# Patient Record
Sex: FEMALE | Race: WHITE | Employment: FULL TIME | URBAN - METROPOLITAN AREA
[De-identification: names, ages, dates, MRNs, and addresses within clinical notes are randomized per-mention and may not be internally consistent; named-entity substitution may affect disease eponyms.]

---

## 2024-04-15 ENCOUNTER — OFFICE VISIT (OUTPATIENT)
Dept: ORTHOPEDIC SURGERY | Facility: CLINIC | Age: 69
End: 2024-04-15
Payer: MEDICARE

## 2024-04-15 VITALS — HEIGHT: 62 IN | BODY MASS INDEX: 31.28 KG/M2 | WEIGHT: 170 LBS

## 2024-04-15 DIAGNOSIS — G56.03 CARPAL TUNNEL SYNDROME, BILATERAL: Primary | ICD-10-CM

## 2024-04-15 PROCEDURE — 99203 OFFICE O/P NEW LOW 30 MIN: CPT | Performed by: ORTHOPAEDIC SURGERY

## 2024-04-15 PROCEDURE — 1036F TOBACCO NON-USER: CPT | Performed by: ORTHOPAEDIC SURGERY

## 2024-04-15 ASSESSMENT — PAIN DESCRIPTION - DESCRIPTORS: DESCRIPTORS: BURNING;ACHING;TINGLING

## 2024-04-15 ASSESSMENT — PAIN SCALES - GENERAL: PAINLEVEL_OUTOF10: 5 - MODERATE PAIN

## 2024-04-15 ASSESSMENT — PAIN - FUNCTIONAL ASSESSMENT: PAIN_FUNCTIONAL_ASSESSMENT: 0-10

## 2024-04-15 NOTE — PROGRESS NOTES
Melissa is a 68 y.o.-year-old right hand dominant female presenting today for evaluation of a long standing history of bilateral hand numbness and weakness.    She was diagnosed with carpal tunnel syndrome many years ago by her primary care physician based on physical examination findings. She has not had any objective testing. She was recommended to consider surgery several years ago, but ignored this recommendation and has been more or less living with it and has noticed progressive pain, progressive weakness and numbness in her hands. She wears braces at night with minimal improvement. She has not had any injections. She also has deformity of her left index finger at the PIP joint that she thinks may be related to a remote injury but has not had any  x-rays, and for that problem.    Please refer to New Patient Intake Form scanned into patient's electronic record for self-reported past medical history, past surgical history, medications, allergies, family history, social history and 10 point review of systems.          Examination:     Constitutional: Oriented to person, place, and time.     Appears well-developed and well-nourished.     Head: Normocephalic and atraumatic.     Eyes: Pupils are equal, round, and reactive to light.     Cardiovascular: Intact distal pulses.     Pulmonary/Chest/Breast: Effort normal. No respiratory distress.     Neurological: Alert and oriented to person, place, and time.     Skin: Skin is warm and dry.     Psychiatric: normal mood and affect. Behavior is normal.     Musculoskeletal: Examination today reveals a degenerative changes bilateral hands. An apex radial coronal plane deformity of the index finger PIP joint, but without any significant swelling. Some mild tenderness over the index finger PIP joint left hand. Bilateral thenar atrophy, worse on the right than on the left. Full digital flexion without triggering. Sensation is diminished in the median nerve distribution. Positive  Jaylinn median nerve compression test bilaterally.          Imaging will be forwarded to the clinic for review.         Impression: Bilateral carpal tunnel syndrome and hand arthritis         Plan: She has interest in getting surgery to to take care of her carpal tunnel syndrome, but I have recommended a test to be performed to help confirm this diagnosis and to make it easier to get insurance authorization for surgery. She is going to have a neuromuscular ultrasound completed. She is also going to have x-rays of her bilateral hands taken today at her place of work and will forward the images to our office so that we can evaluate the degree of arthritis. in her left index finger PIP joint and come up with some treatment options for her. There she will contact me when the ultrasound has been completed and we can then make plans for possible surgical release.       James Escoto MD          [facility] [employer]     Department of Orthopaedic Surgery     Chief of Hand and Upper Extremity Surgery     [facility]     Scribe Attestation  By signing my name below, I, Suzanne Ho Scrsobeida   attest that this documentation has been prepared under the direction and in the presence of James Escoto MD.

## 2024-07-23 ENCOUNTER — PROCEDURE VISIT (OUTPATIENT)
Dept: NEUROLOGY | Facility: CLINIC | Age: 69
End: 2024-07-23
Payer: MEDICARE

## 2024-07-23 DIAGNOSIS — G56.03 BILATERAL CARPAL TUNNEL SYNDROME: Primary | ICD-10-CM

## 2024-07-23 PROCEDURE — 76883 US NRV&ACC STRUX 1XTR COMPRE: CPT | Performed by: STUDENT IN AN ORGANIZED HEALTH CARE EDUCATION/TRAINING PROGRAM

## 2024-07-23 NOTE — PROGRESS NOTES
NEUROMUSCULAR ULTRASOUND OF THE [BILATERAL UPPER EXTREMITIES]     INDICATION:  Clinical Information: [Years of bilateral hand weakness, paresthesia, and pain. Previously diagnosed with bilateral carpal tunnel syndrome, but declined surgical intervention. She is no reconsidering treatment].     Neuromuscular ultrasound to be performed:  (a) to evaluate the echotexture and size of the right and left median nerves in the limb with attention to the carpal tunnel;   (b) to assess for any identifiable structural source of right and left median nerve compression;   (c) to assess adjacent structures to the median nerves;  (d) to assess the right and left wrists joints for abnormalities.      HEIGHT: [5] ft [2] in  WEIGHT: [175] lbs.  HANDEDNESS: [Right]     COMPARISON:   [None.]     TECHNIQUE:  The bilateral median nerves and accompanying structures were studied throughout their entire anatomic course in the limb from the wrist to the axilla, including real-time cine imaging. The examination was performed using a [[employer]] ultrasound machine with a [15-6 MHz matrix linear transducer]. Both axial and longitudinal views were obtained. The patient was examined [supine with the arm extended and supinated.] Cross sectional area (CSA) was measured within the epineurium, using the trace method. At locations where repeat measurements were taken, the mean value is reported below. Transverse and longitudinal images were obtained.      FINDINGS:  At the right wrist at the distal wrist crease (proximal carpal tunnel), the median nerve CSA was [24.8] mm2 (NL < 10 mm2; borderline 10-13 mm2; ABN > 13 mm2).     The echogenicity of the right median nerve at the wrist was [markedly reduced]. The mobility of the right median nerve with flexion and extension of the fingers was [severely reduced]. Color Doppler of the right median nerve showed [normal] median nerve vascularity. [No abnormal] tenosynovitis of the right flexor tendons was noted.      Using a longitudinal scan of the right median nerve at the wrist, [a prominent notch sign was seen] under the flexor retinaculum (image 8).      A right persistent median artery [was not] present. A right bifid median nerve [was] present several centimeters proximal to the distal wrist crease, however, the nerve became contiguous as it traversed the tunnel. No other abnormal mass or ganglia was appreciated in the right carpal tunnel. With extension of the fingers, there was [no abnormal intrusion] of the right flexor digitorum sublimis. With flexion of the fingers, there was [no abnormal intrusion] of the right lumbrical muscles.     In the mid-forearm, approximately 12 cm proximal to the distal wrist crease, the right median nerve was seen between the flexor digitorum sublimis and flexor digitorum profundus muscles. At the mid-forearm, the right median nerve CSA was [7.5] mm2. The ratio of the right median CSAs between the wrist and forearm (WFR) was [3.3] (NL < 1.5; borderline: 1.5 - 2.0). The echogenicity of the right median nerve in the forearm was normal.     The right median nerve was then followed proximal into the forearm and then to the [axilla]. The median nerve was normal in size and echogenicity adjacent to the brachial artery.      Scanning the muscles, the echogenicity was normal of the flexor digitorum sublimis, flexor digitorum profundus, flexor pollicis longus, flexor carpi radialis, and pronator teres. The echogenicity of the abductor pollicis brevis was [increased].     At the right wrist joint, the radial carpal joint revealed synovial hypertrophy as well as irregularities in the bony cortex. No abnormal effusion was seen. The flexor carpi radialis tendon was normal. Both the radial and ulnar arteries were well seen and were normal.      Attention was then turned to the left side. At the left wrist at the distal wrist crease (proximal carpal tunnel), the median nerve CSA was [28.0] mm2 (NL < 10  mm2; borderline 10-13 mm2; ABN > 13 mm2).      The flattening ratio of the left median nerve (ratio of width / height of median nerve in the short- axis view) was [4.5] (NL < 3).     The echogenicity of the left median nerve at the wrist was [reduced]. The mobility of the left median nerve with flexion and extension of the fingers was [reduced]. Color Doppler of the left median nerve showed [normal] median nerve vascularity. [No abnormal] tenosynovitis of the left flexor tendons was noted.     Using a longitudinal scan of the left median nerve at the wrist, [a notch sign was not seen] under the flexor retinaculum.      A left persistent median artery [was not] present. A left bifid median nerve [was not] present. No other abnormal mass or ganglia was appreciated in the left carpal tunnel. With extension of the fingers, there was [no abnormal intrusion] of the left flexor digitorum sublimis. With flexion of the fingers, there was [no abnormal intrusion] of the left lumbrical muscles.     In the mid-forearm, approximately 12 cm proximal to the distal wrist crease, the left median nerve was seen between the flexor digitorum sublimis and flexor digitorum profundus muscles. At the mid-forearm, the left median nerve CSA was [7.4] mm2. The ratio of the left median CSAs between the wrist and forearm (WFR) was [3.8] (NL < 1.5; borderline: 1.5 - 2.0). The echogenicity of the left median nerve in the forearm was normal.     The left median nerve was then followed proximal into the forearm and then to the [axilla]. The median nerve was normal in size and echogenicity adjacent to the brachial artery.      Scanning the muscles, the echogenicity was normal of the flexor digitorum sublimis, flexor digitorum profundus, flexor pollicis longus, flexor carpi radialis, and pronator teres. The echogenicity of the abductor pollicis brevis was [increased].     At the left wrist joint, the radial carpal joint was normal. No abnormal  effusion was seen. The flexor carpi radialis tendon was normal. Both the radial and ulnar arteries were well seen and were normal.     IMPRESSION:  This is an abnormal neuromuscular ultrasound examination of the right and left upper extremities.      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 [In addition, the right median nerve was followed through its anatomic course in the limb from wrist to axilla and was normal above the wrist.]     There [was] ultrasound evidence of a moderate to severe median neuropathy at the left wrist. [In addition, the left median nerve was followed through its anatomic course in the limb from wrist to axilla and was normal above the wrist.]    In addition, there was sonographic evidence of neurogenic atrophy affecting the thenar musculature on both sides.      There was evidence of right radiocarpal joint degenerative changes. The other visualized osseous, ligamentous, joint and tendinous structures appeared normal.       Performed by: Corey John DO  Authorized by: Corey John DO

## 2024-08-02 NOTE — PROGRESS NOTES
I called patient to discuss the results of her recently completed neuromuscular ultrasound test.  This test revealed evidence of bilateral carpal tunnel syndrome graded moderately severe to severe.  She has failed attempted conservative management with activity modifications and bracing.  She has no interest in injections.  At her office visit in April we also discussed the severe bilateral hand arthritis.  At this point she remains uninterested in having any specific treatment initiated for her arthritic conditions.  She wants to proceed with carpal tunnel decompression.  She wants to do her right side first under local anesthesia.    We discussed risks, benefits, alternatives and anticipated post op course including time away from work and activities following surgical treatment for the patient's condition. This is major surgery with identifiable risks. No guarantees for success have been provided. The patient has expressed understanding and has elected to pursue operative treatment.        For Surgical Planning:  Diagnosis: Right carpal tunnel syndrome  Procedure: Right carpal tunnel release  CPT: 22777  Anesthesia: Local only  Duration: 25 minutes  Special Equipment Needed: None  Medical Notes / PM / DM / PAT needed?:  N/A  [place] [facility] [place]  Initial Post Operative Visit: 2 weeks    James Escoto MD    [facility] [employer]  Department of Orthopaedic Surgery  Chief of Hand and Upper Extremity Surgery  [facility]    Dictation performed with the use of voice recognition software. Syntax and grammatical errors may exist.

## 2024-09-11 NOTE — PROGRESS NOTES
Bilateral hand xrays from 8/13/2024 performed at outside facility have been dropped off by patient for my review.    These images reveal:  Neutral ulnar variance  Type 2 lunate  Midcarpal joint arthritis  Thumb CMC, MP and IP  joint arthritis  Finger PIP and DIP joint arthritis  These findings are all fairly symmetric bilaterally except for more significant left index PIP arthritis with coronal plane / apex radial deformity.      James Escoto MD

## 2024-09-30 ENCOUNTER — APPOINTMENT (OUTPATIENT)
Dept: ORTHOPEDIC SURGERY | Facility: CLINIC | Age: 69
End: 2024-09-30
Payer: MEDICARE

## 2024-09-30 VITALS — WEIGHT: 170 LBS | HEIGHT: 62 IN | BODY MASS INDEX: 31.28 KG/M2

## 2024-09-30 DIAGNOSIS — M65.312 TRIGGER FINGER OF LEFT THUMB: ICD-10-CM

## 2024-09-30 DIAGNOSIS — G56.03 CARPAL TUNNEL SYNDROME, BILATERAL: Primary | ICD-10-CM

## 2024-09-30 PROCEDURE — 3008F BODY MASS INDEX DOCD: CPT | Performed by: ORTHOPAEDIC SURGERY

## 2024-09-30 PROCEDURE — 1159F MED LIST DOCD IN RCRD: CPT | Performed by: ORTHOPAEDIC SURGERY

## 2024-09-30 PROCEDURE — 1036F TOBACCO NON-USER: CPT | Performed by: ORTHOPAEDIC SURGERY

## 2024-09-30 PROCEDURE — 99214 OFFICE O/P EST MOD 30 MIN: CPT | Performed by: ORTHOPAEDIC SURGERY

## 2024-09-30 ASSESSMENT — PAIN - FUNCTIONAL ASSESSMENT: PAIN_FUNCTIONAL_ASSESSMENT: 0-10

## 2024-09-30 ASSESSMENT — PAIN DESCRIPTION - DESCRIPTORS: DESCRIPTORS: ACHING;SORE

## 2024-09-30 ASSESSMENT — PAIN SCALES - GENERAL: PAINLEVEL_OUTOF10: 5 - MODERATE PAIN

## 2024-09-30 NOTE — PROGRESS NOTES
Melissa returns to see me.  We have previously diagnosed her with bilateral carpal tunnel syndrome.  She has completed neuromuscular ultrasound examination and is ready to proceed with surgical scheduling.  She reports that she is now also experiencing triggering with flexion of her left oftentimes worse in the morning.  Since her last visit with me she did drop off x-rays of her hands taken at an outside facility and presents to discuss the x-ray findings as well.  Her primary complaints are of the numbness tingling and weakness as well as the triggering of her left thumb.    Past medical history, medications, allergies, surgical history and review of systems have been reviewed with the patient. Pertinent changes are documented in the HPI. Otherwise they are unchanged when compared to last visit on April 15, 2024.    Physical Examination Findings:  Constitutional: Appears well-developed and well-nourished.  Head: Normocephalic and atraumatic.  Eyes: Pupils are equal and round.  Cardiovascular: Intact distal pulses.   Respiratory: Effort normal. No respiratory distress.  Neurologic: Alert and oriented to person, place, and time.  Skin: Skin is warm and dry.  Hematologic / Lymphatic: No lymphedema, lymphangitis.  Psychiatric: normal mood and affect. Behavior is normal.   Musculoskeletal: Bilateral hand examination reveals atrophy of the abductor pollicis brevis bilaterally.  She has arthritic changes primarily involving her DIP joints with Heberden's nodes.  She has coronal plane deformity of the left index finger PIP joint but maintains functional digital range of motion.  She has diminished sensation in the median nerve distribution bilaterally.  She has minimal tenderness to palpation over her joints.    Review of neuromuscular ultrasound examination performed on July 23, 2024 reveals evidence of bilateral moderately severe to severe carpal tunnel syndrome.    Review of x-rays bilateral hands obtained in August at  an outside facility and provided by patient on disc demonstrates diffuse polyarticular osteoarthritic changes particularly involving her midcarpal joints, her bilateral thumb CMC joints, the DIP joints of all fingers bilateral hands as well as her left index finger and small finger PIP joint.    Impression: Bilateral carpal tunnel syndrome with thenar atrophy, left thumb trigger finger, bilateral hand arthritis.    Plan: We have reviewed all of her x-rays today.  She does not have any significant symptoms regarding her hand arthritis and does not have any interest in surgical treatment for these conditions which at this point primarily involve arthrodesis.  She is more interested in treatment for her carpal tunnel syndrome and trigger finger of the left thumb.  She wants to do her left side first.  She will call to schedule surgery.  Anticipate approximately 4 weeks out of work given her job following the surgery.    We discussed risks, benefits, alternatives and anticipated post op course including time away from work and activities following surgical treatment for the patient's condition. This is major surgery with identifiable risks. No guarantees for success have been provided. The patient has expressed understanding and has elected to pursue operative treatment.        For Surgical Planning:  Diagnosis: Left carpal tunnel syndrome, left thumb trigger finger  Procedure: Left carpal tunnel release, left thumb trigger release  CPT: 44229, 00882  Anesthesia: Local only  Duration: 30 minutes  Special Equipment Needed: None  Medical Notes / PM / DM / PAT needed?:  N/A  Location: [place] [facility]  Initial Post Operative Visit: 2 weeks    James Escoto MD    [facility] [employer]  Department of Orthopaedic Surgery  Chief of Hand and Upper Extremity Surgery  [facility]    Dictation performed with the use of voice recognition  software. Syntax and grammatical errors may exist.

## 2024-12-02 DIAGNOSIS — M65.312 TRIGGER THUMB OF LEFT HAND: ICD-10-CM

## 2024-12-02 DIAGNOSIS — G56.03 BILATERAL CARPAL TUNNEL SYNDROME: ICD-10-CM

## 2024-12-06 ENCOUNTER — HOSPITAL ENCOUNTER (OUTPATIENT)
Facility: CLINIC | Age: 69
Setting detail: OUTPATIENT SURGERY
Discharge: HOME | End: 2024-12-06
Attending: ORTHOPAEDIC SURGERY | Admitting: ORTHOPAEDIC SURGERY
Payer: MEDICARE

## 2024-12-06 VITALS
BODY MASS INDEX: 33.02 KG/M2 | OXYGEN SATURATION: 98 % | HEART RATE: 77 BPM | HEIGHT: 62 IN | TEMPERATURE: 98.6 F | WEIGHT: 179.45 LBS | RESPIRATION RATE: 16 BRPM | DIASTOLIC BLOOD PRESSURE: 78 MMHG | SYSTOLIC BLOOD PRESSURE: 168 MMHG

## 2024-12-06 DIAGNOSIS — G56.03 BILATERAL CARPAL TUNNEL SYNDROME: ICD-10-CM

## 2024-12-06 DIAGNOSIS — M65.332 TRIGGER FINGER, LEFT MIDDLE FINGER: ICD-10-CM

## 2024-12-06 DIAGNOSIS — M65.312 TRIGGER THUMB OF LEFT HAND: Primary | ICD-10-CM

## 2024-12-06 PROCEDURE — 3600000008 HC OR TIME - EACH INCREMENTAL 1 MINUTE - PROCEDURE LEVEL THREE: Performed by: ORTHOPAEDIC SURGERY

## 2024-12-06 PROCEDURE — 2500000004 HC RX 250 GENERAL PHARMACY W/ HCPCS (ALT 636 FOR OP/ED): Mod: JG | Performed by: ORTHOPAEDIC SURGERY

## 2024-12-06 PROCEDURE — 7100000010 HC PHASE TWO TIME - EACH INCREMENTAL 1 MINUTE: Performed by: ORTHOPAEDIC SURGERY

## 2024-12-06 PROCEDURE — 3600000003 HC OR TIME - INITIAL BASE CHARGE - PROCEDURE LEVEL THREE: Performed by: ORTHOPAEDIC SURGERY

## 2024-12-06 PROCEDURE — 26055 INCISE FINGER TENDON SHEATH: CPT | Performed by: ORTHOPAEDIC SURGERY

## 2024-12-06 PROCEDURE — 2500000005 HC RX 250 GENERAL PHARMACY W/O HCPCS: Performed by: ORTHOPAEDIC SURGERY

## 2024-12-06 PROCEDURE — 7100000009 HC PHASE TWO TIME - INITIAL BASE CHARGE: Performed by: ORTHOPAEDIC SURGERY

## 2024-12-06 PROCEDURE — 64721 CARPAL TUNNEL SURGERY: CPT | Performed by: ORTHOPAEDIC SURGERY

## 2024-12-06 RX ORDER — HYDROCODONE BITARTRATE AND ACETAMINOPHEN 5; 325 MG/1; MG/1
1 TABLET ORAL EVERY 6 HOURS PRN
Qty: 12 TABLET | Refills: 0 | Status: SHIPPED | OUTPATIENT
Start: 2024-12-06 | End: 2024-12-09

## 2024-12-06 RX ORDER — SODIUM CHLORIDE 0.9 G/100ML
IRRIGANT IRRIGATION AS NEEDED
Status: DISCONTINUED | OUTPATIENT
Start: 2024-12-06 | End: 2024-12-06 | Stop reason: HOSPADM

## 2024-12-06 RX ORDER — ROSUVASTATIN CALCIUM 5 MG/1
5 TABLET, COATED ORAL DAILY
COMMUNITY

## 2024-12-06 ASSESSMENT — PAIN SCALES - GENERAL
PAINLEVEL_OUTOF10: 0 - NO PAIN

## 2024-12-06 ASSESSMENT — PAIN - FUNCTIONAL ASSESSMENT
PAIN_FUNCTIONAL_ASSESSMENT: 0-10

## 2024-12-06 ASSESSMENT — COLUMBIA-SUICIDE SEVERITY RATING SCALE - C-SSRS
2. HAVE YOU ACTUALLY HAD ANY THOUGHTS OF KILLING YOURSELF?: NO
6. HAVE YOU EVER DONE ANYTHING, STARTED TO DO ANYTHING, OR PREPARED TO DO ANYTHING TO END YOUR LIFE?: NO
1. IN THE PAST MONTH, HAVE YOU WISHED YOU WERE DEAD OR WISHED YOU COULD GO TO SLEEP AND NOT WAKE UP?: NO

## 2024-12-06 NOTE — H&P
"History Of Present Illness  Melissa Hand is a 69 y.o. female presenting with left carpal tunnel syndrome, left thumb trigger finger and notes today that she recently began having pain and catching of her left middle finger which she would like to have taken care of today as well.     Past Medical History  Past Medical History:   Diagnosis Date    Hyperlipidemia        Surgical History  Past Surgical History:   Procedure Laterality Date    APPENDECTOMY       SECTION, LOW TRANSVERSE      COSMETIC SURGERY      HERNIA REPAIR      JOINT REPLACEMENT      SINUS SURGERY          Social History  She reports that she has never smoked. She has never used smokeless tobacco. She reports current alcohol use. She reports that she does not use drugs.    Family History  No family history on file.     Allergies  Patient has no known allergies.    Review of Systems   Complete ros reviewed and negative except per HPI  Physical Exam   Constitutional: Comfortable, NAD  HEENT: hearing and vision grossly intact, MMM  Resp: breathing comfortably   CV: extremities warm, well perfused  GI: abd soft  Neuro: AAOx3, sensory and motor grossly intact  Psych: Appropriate mood and affect  MSK: LUE  examination reveals atrophy of the abductor pollicis brevis bilaterally. She has arthritic changes primarily involving her DIP joints with Heberden's nodes. She has coronal plane deformity of the left index finger PIP joint but maintains functional digital range of motion. She has diminished sensation in the median nerve distribution. She has a tender nodule at the level of the a1 pully of the thumb and middle fingers with palpable triggering    Last Recorded Vitals  Blood pressure 133/74, pulse 78, temperature 36.8 °C (98.2 °F), temperature source Temporal, resp. rate 16, height 1.575 m (5' 2\"), weight 81.4 kg (179 lb 7.3 oz), SpO2 96%.    Relevant Results             Assessment/Plan   Assessment & Plan  Bilateral carpal tunnel " syndrome    Trigger thumb of left hand      68 y/o female here for left carpal tunnel release, left thumb and middle finger trigger releases.           Frank Kraft MD

## 2024-12-06 NOTE — DISCHARGE INSTRUCTIONS
Post-Operative Instructions  Dr. James Escoto [phone]    Dressing:  You have a bandage or splint covering your operative site.    You may remove the dressing in 4  days following surgery. Once your dressing is removed you may shower and/or wash your incision in a sink with soap and water. Do not soak your incision. No bath tubs, hot tubs or swimming pools. After washing the wound please pat the incision dry thoroughly. Please use mild soap. Please do not use hydrogen peroxide. Please cover the wound with a band-aid or gauze and change it daily. Please do not apply any salves, creams, lotions or ointments to the surgical incision. Otherwise keep incision clean and dry (no yard work, engine work, etc.)    Post Anesthesia Instructions:  If you received general anesthesia or IV sedation for your procedure for the next 24 hours: No driving, no drinking alcohol, no sleeping aids, no important decision making, and have an adult with you for 24 hours.    Showering:  You may shower following surgery but please adhere to above instructions regarding the dressing. If showering with bandage/splint in place please ensure that it is kept dry and covered while bathing. There are commercially available cast bags that can be used to protect your dressing while showering. If using garbage bags please make sure that there are no holes in the bag and that the bag has been sealed above the dressing. If the bandage gets wet you must contact your surgeon's office to make arrangements to be seen to have the bandage changed.     Ice/Elevation:  The application of ice to your surgical site after surgery will help with pain control and swelling. This can be very effective for 48-72 hours after surgery. Please be careful to avoid getting bandage wet from a leaky ice bag. Please be advised that the ice may need to be applied for longer periods of time for the cooling effect to penetrate the post-operative dressing.     Elevation of the  operative site above the level of the heart as much as possible for the first 48-72 hours after surgery. Use your sling if you have been provided one while standing or walking. If your fingers are not included in the dressing you are encouraged to attempt finger range of motion as this will help with your hand swelling and ultimate recovery.    Pain Medication:  If you received a regional anesthetic on the day of your surgery your arm and hand may be numb for up to 24 hours after surgery. It is important to wear your sling while the block is still effective in order to protect your arm. It is advisable to take pain medications prior to going to sleep in case the regional anesthesia medication wears off while you are sleeping.    Take your pain medications as directed. Narcotic pain medications can cause lethargy, nausea and constipation. Please contact your surgeon's office and discontinue the medication if these symptoms become problematic. Eating a regular diet, drinking fluids and walking can help with constipation from these medications. Avoid alcohol consumption and driving while taking narcotic pain medications.     Additional pain control options:     You are encouraged to take over the counter medications like Advil / Motrin / Ibuprofen / Aleve in addition to your prescribed pain medications after surgery.    Smoking/Tobacco:  Tobacco use is known to interfere with wound and fracture healing and increase post-operative pain. It can also increase your risk of poor outcomes following surgery. Please do not use tobacco or nicotine products following your surgery. This includes smokeless tobacco, or nicotine replacement products (patches, gum, etc.).    Driving:  It is not advisable to operate a vehicle while using narcotic pain medications. Additionally, please be advised that you are likely to have challenges operating a car or motorcycle while you are still in your postoperative dressing and this could  increase your risk of being involved in an accident and being cited for driving while physically impaired.     Warning Signs:  Observe your arm/hand and incision site (if visible) for increased redness, inflammation, drainage, odor or pain that is unrelieved by rest, elevation or medication. Please contact your surgeon's office immediately if you develop any of these issues or if you develop a fever greater than 101°.    Follow Up Appointments:  Your post-operative appointment has been scheduled for 12/23/2024 at 1pm    [facility], [address] [place] [address] Rd., [place], [place], [address]

## 2024-12-06 NOTE — OP NOTE
Left carpal tunnel release, left thumb trigger release / 30 Minutes (L) Operative Note     Date: 2024  OR Location: [facility] OR    Name: Melissa Hand, : 1955, Age: 69 y.o., MRN: [id number], Sex: female    Diagnosis  Pre-op Diagnosis      * Bilateral carpal tunnel syndrome [G56.03]     * Trigger thumb of left hand [M65.312]     * Trigger finger, left middle finger [M65.332] Post-op Diagnosis     * Bilateral carpal tunnel syndrome [G56.03]     * Trigger thumb of left hand [M65.312]     * Trigger finger, left middle finger [M65.332]     Procedures  Left carpal tunnel release, left thumb trigger release / 30 Minutes  57120 - [place] NEUROPLASTY &/TRANSPOS MEDIAN NRV CARPAL TUNNE    MT TENDON SHEATH INCISION [26630]  Surgeons      * James Escoto - Primary    Resident/Fellow/Other Assistant:  Surgeons and Role:  * No surgeons found with a matching role *    Staff:   Circulator: Devon De La Cruz Person: Azeb    Anesthesia Staff: No anesthesia staff entered.    Procedure Summary  Anesthesia: Anesthesia type not filed in the log.  ASA: ASA status not filed in the log.  Estimated Blood Loss: 0 mL  Intra-op Medications: Administrations occurring from 1300 to 1345 on 24:  * No intraprocedure medications in log *      Intraprocedure I/O Totals       None           Specimen: No specimens collected              Drains and/or Catheters: * None in log *    Tourniquet Times:     Total Tourniquet Time Documented:  Arm - Upper (Left) - 17 minutes  Total: Arm - Upper (Left) - 17 minutes      Implants:     Findings: Left carpal tunnel syndrome, left thumb trigger finger, left middle finger trigger finger    Indications: Melissa Hand is an 69 y.o. female who is having surgery for Bilateral carpal tunnel syndrome [G56.03]  Trigger thumb of left hand [M65.312].      The patient was seen in the preoperative area. The risks, benefits, complications, treatment options, non-operative alternatives, expected recovery and  outcomes were discussed with the patient. The possibilities of reaction to medication, pulmonary aspiration, injury to surrounding structures, bleeding, recurrent infection, the need for additional procedures, failure to diagnose a condition, and creating a complication requiring transfusion or operation were discussed with the patient. The patient concurred with the proposed plan, giving informed consent.  The site of surgery was properly noted/marked if necessary per policy. The patient has been actively warmed in preoperative area. Preoperative antibiotics are not indicated. Venous thrombosis prophylaxis are not indicated.    Procedure Details:   69-year-old female with symptomatic carpal tunnel syndrome and triggering to the left thumb presents today for carpal tunnel release and trigger thumb release.  Preoperatively she has also identified new onset left middle finger trigger finger and is requested release of this digit as well.  All sites were identified and marked for surgery.  Informed consent process was completed.    Patient was brought to the operating and placed supine on the operating table.  Timeout procedure was performed to verify the correct patient, procedure and operative site.  Local anesthetic infiltrated at the base of the palm as well as in the distal palm at the base of the middle finger and the base of the thumb.  Left upper extremity prepped and draped in usual sterile fashion.  Limb is exsanguinated tourniquet was inflated to 250 mmHg.    Beginning with the carpal tunnel we made a longitudinal incision the central aspect of the proximal palm.  Sharp dissection carried through the superficial palmar fascia.  Deep retractors were placed.  The transverse carpal ligament was exposed and then divided longitudinally along its ulnar margin.  Complete median nerve decompression was confirmed.    We then made an incision in the distal palmar crease overlying the middle finger flexor sheath.   Blunt dissection was carried down to expose the tendon sheath.  Retractors placed to protect the neurovascular bundles.  The sheath was incised and the A1 pulley was released.  Patient was able to demonstrate full finger flexion without triggering.    Finally we made a transverse incision at the thumb MP joint flexion crease.  We bluntly dissected to expose the FPL tendon sheath.  Radial neurovascular bundle identified and mobilized in a radial direction.  The sheath was incised and the A1 pulley was divided along its radial margin.  Complete release confirmed.  No further triggering identified.    All 3 wounds were irrigated and then closed in interrupted fashion.  Sterile bandages were applied.  Tourniquet was deflated.  Patient was transferred to recovery in stable condition.    Postoperatively she will be discharged home once comfortable.  She can remove her bandage on postop day #4 and begin wound care with gentle activities as instructed.  Return to clinic in 2 weeks for wound check and advancement of her activities.        Complications:  None; patient tolerated the procedure well.    Disposition: PACU - hemodynamically stable.  Condition: stable                 Additional Details:      Attending Attestation: I was present and scrubbed for the entire procedure.    James Escoto  Phone Number: [phone]

## 2024-12-09 ASSESSMENT — PAIN SCALES - GENERAL: PAINLEVEL_OUTOF10: 2

## 2024-12-23 ENCOUNTER — OFFICE VISIT (OUTPATIENT)
Dept: ORTHOPEDIC SURGERY | Facility: CLINIC | Age: 69
End: 2024-12-23
Payer: MEDICARE

## 2024-12-23 VITALS — WEIGHT: 179 LBS | BODY MASS INDEX: 32.94 KG/M2 | HEIGHT: 62 IN

## 2024-12-23 DIAGNOSIS — M65.312 TRIGGER FINGER OF LEFT THUMB: ICD-10-CM

## 2024-12-23 DIAGNOSIS — G56.03 CARPAL TUNNEL SYNDROME, BILATERAL: Primary | ICD-10-CM

## 2024-12-23 PROCEDURE — 1036F TOBACCO NON-USER: CPT | Performed by: ORTHOPAEDIC SURGERY

## 2024-12-23 PROCEDURE — 99024 POSTOP FOLLOW-UP VISIT: CPT | Performed by: ORTHOPAEDIC SURGERY

## 2024-12-23 PROCEDURE — 3008F BODY MASS INDEX DOCD: CPT | Performed by: ORTHOPAEDIC SURGERY

## 2024-12-23 PROCEDURE — 1159F MED LIST DOCD IN RCRD: CPT | Performed by: ORTHOPAEDIC SURGERY

## 2024-12-23 ASSESSMENT — PAIN SCALES - GENERAL: PAINLEVEL_OUTOF10: 5 - MODERATE PAIN

## 2024-12-23 ASSESSMENT — PAIN - FUNCTIONAL ASSESSMENT: PAIN_FUNCTIONAL_ASSESSMENT: 0-10

## 2024-12-23 ASSESSMENT — PAIN DESCRIPTION - DESCRIPTORS: DESCRIPTORS: ACHING;SORE

## 2024-12-23 NOTE — PROGRESS NOTES
First postoperative visit following left carpal tunnel release and left middle finger and thumb trigger finger release performed on December 6, 2024.  Patient reports that she is doing well following the carpal tunnel and trigger thumb release but she is still having some swelling and stiffness with her middle finger.    Examination reveals that all surgical wounds are well-healed.  She has excellent finger flexion with no triggering.  She has discomfort with attempts at passive hyperextension of the middle finger.  Sensation intact to light touch.    Impression: Carpal tunnel syndrome, multiple trigger fingers.    Plan: We have recommended scar massage techniques and progressive efforts at active, active assist and passive range of motion of all of her fingers.  I have no formal restrictions for her.  She will return to work on January 2, 2025.  Return to see me as needed for any further issues or concerns with her hands.    James Escoto MD    [facility] [employer]  Department of Orthopaedic Surgery  Chief of Hand and Upper Extremity Surgery  [facility]    Dictation performed with the use of voice recognition software. Syntax and grammatical errors may exist.

## 2024-12-23 NOTE — LETTER
December 23, 2024     Patient: Melissa Hand   YOB: 1955   Date of Visit: 12/23/2024       To Whom It May Concern:    It is my medical opinion that Melissa Hand may return to work on 1/2/2025 full duty .    If you have any questions or concerns, please don't hesitate to call.         Sincerely,        James Escoto MD    CC: No Recipients

## 2025-01-24 DIAGNOSIS — G56.03 CARPAL TUNNEL SYNDROME, BILATERAL: ICD-10-CM

## 2025-02-07 ENCOUNTER — HOSPITAL ENCOUNTER (OUTPATIENT)
Facility: CLINIC | Age: 70
Setting detail: OUTPATIENT SURGERY
Discharge: HOME | End: 2025-02-07
Attending: ORTHOPAEDIC SURGERY | Admitting: ORTHOPAEDIC SURGERY
Payer: MEDICARE

## 2025-02-07 VITALS
HEIGHT: 61 IN | DIASTOLIC BLOOD PRESSURE: 89 MMHG | SYSTOLIC BLOOD PRESSURE: 152 MMHG | RESPIRATION RATE: 16 BRPM | HEART RATE: 73 BPM | OXYGEN SATURATION: 96 % | BODY MASS INDEX: 32.42 KG/M2 | WEIGHT: 171.74 LBS | TEMPERATURE: 96.8 F

## 2025-02-07 DIAGNOSIS — G56.03 CARPAL TUNNEL SYNDROME, BILATERAL: Primary | ICD-10-CM

## 2025-02-07 PROCEDURE — 3600000003 HC OR TIME - INITIAL BASE CHARGE - PROCEDURE LEVEL THREE: Performed by: ORTHOPAEDIC SURGERY

## 2025-02-07 PROCEDURE — 7100000009 HC PHASE TWO TIME - INITIAL BASE CHARGE: Performed by: ORTHOPAEDIC SURGERY

## 2025-02-07 PROCEDURE — 2500000004 HC RX 250 GENERAL PHARMACY W/ HCPCS (ALT 636 FOR OP/ED): Performed by: ORTHOPAEDIC SURGERY

## 2025-02-07 PROCEDURE — 7100000010 HC PHASE TWO TIME - EACH INCREMENTAL 1 MINUTE: Performed by: ORTHOPAEDIC SURGERY

## 2025-02-07 PROCEDURE — 64721 CARPAL TUNNEL SURGERY: CPT | Performed by: ORTHOPAEDIC SURGERY

## 2025-02-07 PROCEDURE — 3600000008 HC OR TIME - EACH INCREMENTAL 1 MINUTE - PROCEDURE LEVEL THREE: Performed by: ORTHOPAEDIC SURGERY

## 2025-02-07 PROCEDURE — 2500000005 HC RX 250 GENERAL PHARMACY W/O HCPCS: Performed by: ORTHOPAEDIC SURGERY

## 2025-02-07 RX ORDER — LIDOCAINE HYDROCHLORIDE 10 MG/ML
INJECTION, SOLUTION INFILTRATION; PERINEURAL AS NEEDED
Status: DISCONTINUED | OUTPATIENT
Start: 2025-02-07 | End: 2025-02-07 | Stop reason: HOSPADM

## 2025-02-07 RX ORDER — HYDROCODONE BITARTRATE AND ACETAMINOPHEN 5; 325 MG/1; MG/1
1 TABLET ORAL EVERY 6 HOURS PRN
Qty: 20 TABLET | Refills: 0 | Status: SHIPPED | OUTPATIENT
Start: 2025-02-07 | End: 2025-02-19

## 2025-02-07 RX ORDER — SODIUM CHLORIDE 0.9 G/100ML
INJECTION, SOLUTION IRRIGATION AS NEEDED
Status: DISCONTINUED | OUTPATIENT
Start: 2025-02-07 | End: 2025-02-07 | Stop reason: HOSPADM

## 2025-02-07 RX ORDER — ACETAMINOPHEN 500 MG
1000 TABLET ORAL EVERY 8 HOURS PRN
Qty: 30 TABLET | Refills: 0 | Status: SHIPPED | OUTPATIENT
Start: 2025-02-07

## 2025-02-07 RX ORDER — DOCUSATE SODIUM 100 MG/1
100 CAPSULE, LIQUID FILLED ORAL 2 TIMES DAILY
Qty: 60 CAPSULE | Refills: 0 | Status: SHIPPED | OUTPATIENT
Start: 2025-02-07 | End: 2025-03-09

## 2025-02-07 RX ORDER — BUPIVACAINE HYDROCHLORIDE 5 MG/ML
INJECTION, SOLUTION EPIDURAL; INTRACAUDAL AS NEEDED
Status: DISCONTINUED | OUTPATIENT
Start: 2025-02-07 | End: 2025-02-07 | Stop reason: HOSPADM

## 2025-02-07 ASSESSMENT — ENCOUNTER SYMPTOMS
GASTROINTESTINAL NEGATIVE: 1
EYES NEGATIVE: 1
RESPIRATORY NEGATIVE: 1
CARDIOVASCULAR NEGATIVE: 1
CONSTITUTIONAL NEGATIVE: 1

## 2025-02-07 ASSESSMENT — PAIN - FUNCTIONAL ASSESSMENT: PAIN_FUNCTIONAL_ASSESSMENT: 0-10

## 2025-02-07 ASSESSMENT — COLUMBIA-SUICIDE SEVERITY RATING SCALE - C-SSRS
1. IN THE PAST MONTH, HAVE YOU WISHED YOU WERE DEAD OR WISHED YOU COULD GO TO SLEEP AND NOT WAKE UP?: NO
2. HAVE YOU ACTUALLY HAD ANY THOUGHTS OF KILLING YOURSELF?: NO
6. HAVE YOU EVER DONE ANYTHING, STARTED TO DO ANYTHING, OR PREPARED TO DO ANYTHING TO END YOUR LIFE?: NO

## 2025-02-07 NOTE — OP NOTE
Right Carpal Tunnel Release / 25 Minutes (R) Operative Note     Date: 2025  OR Location: [facility] OR    Name: Melissa Hand, : 1955, Age: 69 y.o., MRN: [id number], Sex: female    Diagnosis  Pre-op Diagnosis      * Carpal tunnel syndrome, bilateral [G56.03] Post-op Diagnosis     * Carpal tunnel syndrome, bilateral [G56.03]     Procedures  Right Carpal Tunnel Release / 25 Minutes  61922 - [place] NEUROPLASTY &/TRANSPOS MEDIAN NRV CARPAL TUNNE      Surgeons      * James Escoto - Primary    Resident/Fellow/Other Assistant:  Surgeons and Role:  * No surgeons found with a matching role *    Staff:   Circulator: Alison De La Cruz Person: Edwin  Circulator: Azeb    Anesthesia Staff: No anesthesia staff entered.    Procedure Summary  Anesthesia: Anesthesia type not filed in the log.  ASA: ASA status not filed in the log.  Estimated Blood Loss: 0 mL  Intra-op Medications: Administrations occurring from 0900 to 0945 on 25:  * No intraprocedure medications in log *      Intraprocedure I/O Totals       None           Specimen: No specimens collected              Drains and/or Catheters: * None in log *    Tourniquet Times:     Total Tourniquet Time Documented:  Arm - Lower (Right) - 11 minutes  Total: Arm - Lower (Right) - 11 minutes      Implants:     Findings: Right carpal tunnel syndrome    Indications: Melissa Hand is an 69 y.o. female who is having surgery for Carpal tunnel syndrome, bilateral [G56.03].      The patient was seen in the preoperative area. The risks, benefits, complications, treatment options, non-operative alternatives, expected recovery and outcomes were discussed with the patient. The possibilities of reaction to medication, pulmonary aspiration, injury to surrounding structures, bleeding, recurrent infection, the need for additional procedures, failure to diagnose a condition, and creating a complication requiring transfusion or operation were discussed with the patient. The  patient concurred with the proposed plan, giving informed consent.  The site of surgery was properly noted/marked if necessary per policy. The patient has been actively warmed in preoperative area. Preoperative antibiotics are not indicated. Venous thrombosis prophylaxis are not indicated.    Procedure Details:    69-year-old female presents today for right carpal tunnel release.  She did well with left carpal tunnel decompression and trigger finger release several months ago.  Preoperatively the right hand was identified and marked for surgery.  Informed consent process was completed.    Patient was brought to the operating room and placed supine on the operating table.  A timeout procedure was performed to verify correct patient, procedure and operative site.  Local anesthetic infiltrated the base of the right palm.  Right upper extremity prepped and draped in usual sterile fashion.  Limb was exsanguinated and tourniquet was inflated to 250 mmHg.    Longitudinal incision was created in the central aspect of the proximal palm.  Dissection carried through the superficial palmar fascia.  Deep retractors were placed.  Transverse carpal ligament was exposed and then divided longitudinally along the ulnar half.  Complete median nerve decompression was confirmed.  The wound was irrigated and closed in interrupted fashion.  A sterile bandage was applied and the tourniquet was deflated.  The patient was transferred to recovery in stable condition.    Postoperatively she will be discharged home once comfortable.  She can remove her bandage on postop day #4 begin wound care with gentle activities as instructed.  Return to clinic in 2 weeks for wound check and advancement of activities.        Complications:  None; patient tolerated the procedure well.    Disposition: PACU - hemodynamically stable.  Condition: stable                 Additional Details:      Attending Attestation: I was present and scrubbed for the entire  procedure.    James Escoto  Phone Number: [phone]

## 2025-02-07 NOTE — H&P
History Of Present Illness  Melissa Hand is a 69 y.o. female presenting with right carpal tunnel syndrome.     Past Medical History  She has a past medical history of Hyperlipidemia.    Surgical History  She has a past surgical history that includes Hernia repair; Appendectomy; Joint replacement;  section, low transverse; Cosmetic surgery; and Sinus surgery.     Social History  She reports that she has never smoked. She has never used smokeless tobacco. She reports current alcohol use. She reports that she does not use drugs.    Family History  No family history on file.     Allergies  Patient has no known allergies.    Review of Systems   Constitutional: Negative.    HENT: Negative.     Eyes: Negative.    Respiratory: Negative.     Cardiovascular: Negative.    Gastrointestinal: Negative.    Musculoskeletal:         Numbness and pain in thumb, ring finger, long finger        Physical Exam  GEN - NAD, resting comfortably in hospital bed  HEENT - MMM, EOMI, NCAT  CV - RRR by peripheral palpation, limbs wwp  PULM - NWOB on RA  NEURO - GENTILE spontaneously  PSYCH - Appropriate mood and affect  MSK - Positive Durkan, tinel's in right wrist      Last Recorded Vitals  There were no vitals taken for this visit.    Relevant Results      Scheduled medications    Continuous medications    PRN medications    No results found for this or any previous visit (from the past 24 hours).    Assessment/Plan   Assessment & Plan  Carpal tunnel syndrome, bilateral      Melissa Hand is a 69 y.o. female with right carpal tunnel syndrome. Plan to proceed with right open carpal tunnel release.            Arpit Brewster MD

## 2025-02-07 NOTE — DISCHARGE INSTRUCTIONS
Post-Operative Instructions  Dr. James Escoto [phone]    Dressing:  You have a bandage or splint covering your operative site.    You may remove the dressing in 4  days following surgery. Once your dressing is removed you may shower and/or wash your incision in a sink with soap and water. Do not soak your incision. No bath tubs, hot tubs or swimming pools. After washing the wound please pat the incision dry thoroughly. Please use mild soap. Please do not use hydrogen peroxide. Please cover the wound with a band-aid or gauze and change it daily. Please do not apply any salves, creams, lotions or ointments to the surgical incision. Otherwise keep incision clean and dry (no yard work, engine work, etc.)    Post Anesthesia Instructions:  If you received general anesthesia or IV sedation for your procedure for the next 24 hours: No driving, no drinking alcohol, no sleeping aids, no important decision making, and have an adult with you for 24 hours.    Showering:  You may shower following surgery but please adhere to above instructions regarding the dressing. If showering with bandage/splint in place please ensure that it is kept dry and covered while bathing. There are commercially available cast bags that can be used to protect your dressing while showering. If using garbage bags please make sure that there are no holes in the bag and that the bag has been sealed above the dressing. If the bandage gets wet you must contact your surgeon's office to make arrangements to be seen to have the bandage changed.     Ice/Elevation:  The application of ice to your surgical site after surgery will help with pain control and swelling. This can be very effective for 48-72 hours after surgery. Please be careful to avoid getting bandage wet from a leaky ice bag. Please be advised that the ice may need to be applied for longer periods of time for the cooling effect to penetrate the post-operative dressing.     Elevation of the  operative site above the level of the heart as much as possible for the first 48-72 hours after surgery. Use your sling if you have been provided one while standing or walking. If your fingers are not included in the dressing you are encouraged to attempt finger range of motion as this will help with your hand swelling and ultimate recovery.    Pain Medication:  If you received a regional anesthetic on the day of your surgery your arm and hand may be numb for up to 24 hours after surgery. It is important to wear your sling while the block is still effective in order to protect your arm. It is advisable to take pain medications prior to going to sleep in case the regional anesthesia medication wears off while you are sleeping.    Take your pain medications as directed. Narcotic pain medications can cause lethargy, nausea and constipation. Please contact your surgeon's office and discontinue the medication if these symptoms become problematic. Eating a regular diet, drinking fluids and walking can help with constipation from these medications. Avoid alcohol consumption and driving while taking narcotic pain medications.     Additional pain control options:     You are encouraged to take over the counter medications like Advil / Motrin / Ibuprofen / Aleve in addition to your prescribed pain medications after surgery.    Smoking/Tobacco:  Tobacco use is known to interfere with wound and fracture healing and increase post-operative pain. It can also increase your risk of poor outcomes following surgery. Please do not use tobacco or nicotine products following your surgery. This includes smokeless tobacco, or nicotine replacement products (patches, gum, etc.).    Driving:  It is not advisable to operate a vehicle while using narcotic pain medications. Additionally, please be advised that you are likely to have challenges operating a car or motorcycle while you are still in your postoperative dressing and this could  increase your risk of being involved in an accident and being cited for driving while physically impaired.     Warning Signs:  Observe your arm/hand and incision site (if visible) for increased redness, inflammation, drainage, odor or pain that is unrelieved by rest, elevation or medication. Please contact your surgeon's office immediately if you develop any of these issues or if you develop a fever greater than 101°.    Follow Up Appointments:  Your post-operative appointment has been scheduled for 2/24/25 at 9:30 am with Shirin Ramírez    [facility], [address] [place], [place], [address]

## 2025-02-10 ASSESSMENT — PAIN SCALES - GENERAL: PAINLEVEL_OUTOF10: 0 - NO PAIN

## 2025-02-24 ENCOUNTER — APPOINTMENT (OUTPATIENT)
Dept: ORTHOPEDIC SURGERY | Facility: CLINIC | Age: 70
End: 2025-02-24
Payer: MEDICARE

## 2025-02-27 ENCOUNTER — APPOINTMENT (OUTPATIENT)
Dept: ORTHOPEDIC SURGERY | Facility: CLINIC | Age: 70
End: 2025-02-27
Payer: MEDICARE

## 2025-02-27 NOTE — PROGRESS NOTES
[facility]  Hand and Upper Extremity Service  Post Operative visit         Date of surgery: 2/7/25    Surgery(s) performed: Right carpal tunnel release        Subjective report: First postoperative visit.        Exam findings: Right hand reveals well healed surgical incision.        Radiograph findings:       Impression: Right carpal tunnel syndrome        Plan:         Follow Up:             James Escoto MD    [facility] [employer]  Department of Orthopaedic Surgery  Chief of Hand and Upper Extremity Surgery  [facility]    Scribe Attestation  By signing my name below, IAdi Scribe   attest that this documentation has been prepared under the direction and in the presence of Dr. James Escoto.      Dictation performed with the use of voice recognition software. Syntax and grammatical errors may exist.

## 2025-03-03 ENCOUNTER — APPOINTMENT (OUTPATIENT)
Dept: ORTHOPEDIC SURGERY | Facility: CLINIC | Age: 70
End: 2025-03-03
Payer: MEDICARE

## 2025-03-03 VITALS — BODY MASS INDEX: 32.28 KG/M2 | WEIGHT: 171 LBS | HEIGHT: 61 IN

## 2025-03-03 DIAGNOSIS — G56.03 CARPAL TUNNEL SYNDROME, BILATERAL: Primary | ICD-10-CM

## 2025-03-03 PROCEDURE — 3008F BODY MASS INDEX DOCD: CPT | Performed by: ORTHOPAEDIC SURGERY

## 2025-03-03 PROCEDURE — 99024 POSTOP FOLLOW-UP VISIT: CPT | Performed by: ORTHOPAEDIC SURGERY

## 2025-03-03 PROCEDURE — 1036F TOBACCO NON-USER: CPT | Performed by: ORTHOPAEDIC SURGERY

## 2025-03-03 PROCEDURE — 1159F MED LIST DOCD IN RCRD: CPT | Performed by: ORTHOPAEDIC SURGERY

## 2025-03-03 ASSESSMENT — PAIN - FUNCTIONAL ASSESSMENT: PAIN_FUNCTIONAL_ASSESSMENT: NO/DENIES PAIN

## 2025-03-03 NOTE — LETTER
March 3, 2025     Patient: Melissa Hand   YOB: 1955   Date of Visit: 3/3/2025       To Whom It May Concern:    It is my medical opinion that Melissa Hand may return to work on 3/5/25 .    If you have any questions or concerns, please don't hesitate to call.         Sincerely,        James Escoto MD    CC: No Recipients

## 2025-05-14 ENCOUNTER — LAB REQUISITION (OUTPATIENT)
Dept: LAB | Facility: HOSPITAL | Age: 70
End: 2025-05-14

## 2025-05-14 ENCOUNTER — APPOINTMENT (OUTPATIENT)
Dept: GASTROENTEROLOGY | Facility: CLINIC | Age: 70
End: 2025-05-14
Payer: MEDICARE

## 2025-05-14 VITALS — WEIGHT: 160 LBS | OXYGEN SATURATION: 95 % | HEIGHT: 61 IN | HEART RATE: 73 BPM | BODY MASS INDEX: 30.21 KG/M2

## 2025-05-14 DIAGNOSIS — K62.5 HEMORRHAGE OF ANUS AND RECTUM: ICD-10-CM

## 2025-05-14 DIAGNOSIS — K64.9 HEMORRHOIDS, UNSPECIFIED HEMORRHOID TYPE: ICD-10-CM

## 2025-05-14 DIAGNOSIS — K59.00 CONSTIPATION, UNSPECIFIED: ICD-10-CM

## 2025-05-14 DIAGNOSIS — K62.5 RECTAL BLEEDING: ICD-10-CM

## 2025-05-14 DIAGNOSIS — K59.00 CONSTIPATION, UNSPECIFIED CONSTIPATION TYPE: ICD-10-CM

## 2025-05-14 DIAGNOSIS — R10.30 LOWER ABDOMINAL PAIN: Primary | ICD-10-CM

## 2025-05-14 DIAGNOSIS — R10.30 LOWER ABDOMINAL PAIN, UNSPECIFIED: ICD-10-CM

## 2025-05-14 DIAGNOSIS — K60.2 ANAL FISSURE: ICD-10-CM

## 2025-05-14 LAB
ALBUMIN SERPL BCP-MCNC: 4.6 G/DL (ref 3.4–5)
ALP SERPL-CCNC: 60 U/L (ref 33–136)
ALT SERPL W P-5'-P-CCNC: 25 U/L (ref 7–45)
ANION GAP SERPL CALC-SCNC: 13 MMOL/L (ref 10–20)
AST SERPL W P-5'-P-CCNC: 24 U/L (ref 9–39)
BILIRUB SERPL-MCNC: 0.4 MG/DL (ref 0–1.2)
BUN SERPL-MCNC: 20 MG/DL (ref 6–23)
CALCIUM SERPL-MCNC: 9.5 MG/DL (ref 8.6–10.3)
CHLORIDE SERPL-SCNC: 106 MMOL/L (ref 98–107)
CO2 SERPL-SCNC: 26 MMOL/L (ref 21–32)
CREAT SERPL-MCNC: 0.76 MG/DL (ref 0.5–1.05)
EGFRCR SERPLBLD CKD-EPI 2021: 85 ML/MIN/1.73M*2
ERYTHROCYTE [DISTWIDTH] IN BLOOD BY AUTOMATED COUNT: 13.8 % (ref 11.5–14.5)
GLUCOSE SERPL-MCNC: 140 MG/DL (ref 74–99)
HCT VFR BLD AUTO: 43 % (ref 36–46)
HGB BLD-MCNC: 14 G/DL (ref 12–16)
MCH RBC QN AUTO: 28.2 PG (ref 26–34)
MCHC RBC AUTO-ENTMCNC: 32.6 G/DL (ref 32–36)
MCV RBC AUTO: 87 FL (ref 80–100)
NRBC BLD-RTO: 0 /100 WBCS (ref 0–0)
PLATELET # BLD AUTO: 231 X10*3/UL (ref 150–450)
POTASSIUM SERPL-SCNC: 4.5 MMOL/L (ref 3.5–5.3)
PROT SERPL-MCNC: 7.1 G/DL (ref 6.4–8.2)
RBC # BLD AUTO: 4.96 X10*6/UL (ref 4–5.2)
SODIUM SERPL-SCNC: 140 MMOL/L (ref 136–145)
WBC # BLD AUTO: 9 X10*3/UL (ref 4.4–11.3)

## 2025-05-14 PROCEDURE — 85027 COMPLETE CBC AUTOMATED: CPT

## 2025-05-14 PROCEDURE — 3008F BODY MASS INDEX DOCD: CPT | Performed by: INTERNAL MEDICINE

## 2025-05-14 PROCEDURE — 99204 OFFICE O/P NEW MOD 45 MIN: CPT | Performed by: INTERNAL MEDICINE

## 2025-05-14 PROCEDURE — 1159F MED LIST DOCD IN RCRD: CPT | Performed by: INTERNAL MEDICINE

## 2025-05-14 PROCEDURE — 80053 COMPREHEN METABOLIC PANEL: CPT

## 2025-05-14 RX ORDER — HYDROCORTISONE 25 MG/G
CREAM TOPICAL 2 TIMES DAILY
Qty: 60 G | Refills: 1 | Status: SHIPPED | OUTPATIENT
Start: 2025-05-14 | End: 2025-06-13

## 2025-05-14 ASSESSMENT — ENCOUNTER SYMPTOMS
ARTHRALGIAS: 1
CARDIOVASCULAR NEGATIVE: 1
CONSTITUTIONAL NEGATIVE: 1
RESPIRATORY NEGATIVE: 1

## 2025-05-14 NOTE — PATIENT INSTRUCTIONS
Hydrocortisone for hemorrhoid 2-3 times per day for up to 7 days at a time.  Please also get fissure cream from [facility] pharmacy and use 2 times a day for 8 weeks.     Start MiraLAX 17 grams twice daily.  You can mix in anything you like to drink.      I have ordered a stat CT abd/pelvis.     Please stop by the lab to get labs before CT.

## 2025-05-14 NOTE — PROGRESS NOTES
Subjective     History of Present Illness:     68yo with carpal tunnel s/p release, DL, anxiety, lichen planus, prediabetes seen for evaluation of hemorrhoids.  Colonoscopy at [facility] 3/2021- moderate difficulty due to divertica and restricted mobility- diverticulosis, narrowing associated with diverticular opening, otherwise normal    Last night abdomen really hard and distended. Started drinking a lot of water and eventually   Had passage of small stool.  8 mo feeling constipated, straining difficulty evacuating then external hemorrhoids with bleeding and pain.  Has tried witch hazel and barrier creams without significant improvement.  Also now with distended abdomen and discomfort.  Last night dry heaves.  No unintentional weight loss.  Prior to 8 mo was having regular bowel movements without straining or incomplete evacuation.   Took miralax once with better bowel movements but did not continue.  Repots pain with defecation , sharp., blood on surface of stool and on tissue.        Soc hx: former smoker quit 2013, 4 etoh drinks per week, + marijuana    Fam hx:  Father lung cancer, bladder cancer, Mitral valve  Mother gastric cancer, RA, CVA  GF dementia  Grandmother, uncle colon cancer   Grandmother DM  Cousins breast cancer    Surgeries: appendectomy, hernia repair, carpal tunnel      Review of Systems  Review of Systems   Constitutional: Negative.    Respiratory: Negative.     Cardiovascular: Negative.    Genitourinary: Negative.    Musculoskeletal:  Positive for arthralgias.       Past Medical History  Medical History[1]    Social History  Social History[2]    Family History  Family History[3]     Allergies  RX Allergies[4]    Medications  Current Outpatient Medications   Medication Instructions    acetaminophen (TYLENOL EXTRA STRENGTH) 1,000 mg, oral, Every 8 hours PRN    rosuvastatin (CRESTOR) 5 mg, Daily        Objective   There were no vitals taken for this visit.   Physical Exam  Cardiovascular:      Rate and  Rhythm: Normal rate and regular rhythm.   Pulmonary:      Effort: Pulmonary effort is normal. No respiratory distress.      Breath sounds: Normal breath sounds. No wheezing.   Abdominal:      General: Bowel sounds are normal. There is no distension.      Palpations: Abdomen is soft. There is no mass.      Tenderness: There is no abdominal tenderness.   Genitourinary:     Comments: + external, hemorrhoids, no blood or swelling, HERNANDEZ significant discomfort, no blood, minmal stool in vault.  Unable to assess for fissure due to discomfort during examination .  Neurological:      Mental Status: She is alert.               Assessment/Plan     68yo with carpal tunnel s/p release, DL, anxiety, lichen planus, prediabetes seen for evaluation of abdominal discomfort, constipation, anorectal pain. Unclear trigger for constipation, but resulting in external hemorrhoids and suspected anal fissure given examination findings.  Recommend CT a/p to evaluate for diverticulitis, other cause to constipation. Will start hydrocortisone cream and nifedipine cream for anal fissure, and miralax BID.    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  [4] No Known Allergies

## 2025-05-15 ENCOUNTER — HOSPITAL ENCOUNTER (OUTPATIENT)
Dept: RADIOLOGY | Facility: CLINIC | Age: 70
Discharge: HOME | End: 2025-05-15
Payer: MEDICARE

## 2025-05-15 ENCOUNTER — APPOINTMENT (OUTPATIENT)
Dept: RADIOLOGY | Facility: CLINIC | Age: 70
End: 2025-05-15
Payer: MEDICARE

## 2025-05-15 DIAGNOSIS — K59.00 CONSTIPATION, UNSPECIFIED CONSTIPATION TYPE: ICD-10-CM

## 2025-05-15 DIAGNOSIS — K62.5 RECTAL BLEEDING: ICD-10-CM

## 2025-05-15 DIAGNOSIS — R10.30 LOWER ABDOMINAL PAIN: ICD-10-CM

## 2025-05-15 PROCEDURE — 74177 CT ABD & PELVIS W/CONTRAST: CPT

## 2025-05-15 PROCEDURE — 2550000001 HC RX 255 CONTRASTS: Mod: JZ | Performed by: INTERNAL MEDICINE

## 2025-05-15 RX ADMIN — IOHEXOL 75 ML: 350 INJECTION, SOLUTION INTRAVENOUS at 15:34

## 2025-05-16 ENCOUNTER — APPOINTMENT (OUTPATIENT)
Dept: RADIOLOGY | Facility: CLINIC | Age: 70
End: 2025-05-16
Payer: MEDICARE

## 2025-05-16 DIAGNOSIS — K46.9 HERNIA OF SMALL INTESTINE: Primary | ICD-10-CM

## 2025-06-17 ENCOUNTER — APPOINTMENT (OUTPATIENT)
Dept: GASTROENTEROLOGY | Facility: CLINIC | Age: 70
End: 2025-06-17
Payer: MEDICARE

## 2025-06-17 VITALS — BODY MASS INDEX: 29.45 KG/M2 | HEART RATE: 83 BPM | HEIGHT: 61 IN | WEIGHT: 156 LBS

## 2025-06-17 DIAGNOSIS — K59.00 CONSTIPATION, UNSPECIFIED CONSTIPATION TYPE: ICD-10-CM

## 2025-06-17 DIAGNOSIS — R10.30 LOWER ABDOMINAL PAIN: ICD-10-CM

## 2025-06-17 DIAGNOSIS — K64.9 HEMORRHOIDS, UNSPECIFIED HEMORRHOID TYPE: Primary | ICD-10-CM

## 2025-06-17 PROCEDURE — 99214 OFFICE O/P EST MOD 30 MIN: CPT | Performed by: INTERNAL MEDICINE

## 2025-06-17 PROCEDURE — 1159F MED LIST DOCD IN RCRD: CPT | Performed by: INTERNAL MEDICINE

## 2025-06-17 PROCEDURE — 1036F TOBACCO NON-USER: CPT | Performed by: INTERNAL MEDICINE

## 2025-06-17 PROCEDURE — 3008F BODY MASS INDEX DOCD: CPT | Performed by: INTERNAL MEDICINE

## 2025-06-17 RX ORDER — ACETAMINOPHEN 500 MG
TABLET ORAL DAILY
COMMUNITY

## 2025-06-17 RX ORDER — CLONAZEPAM 0.5 MG/1
TABLET, ORALLY DISINTEGRATING ORAL
COMMUNITY

## 2025-06-17 RX ORDER — IBUPROFEN 800 MG/1
TABLET, FILM COATED ORAL
COMMUNITY

## 2025-06-17 RX ORDER — POLYETHYLENE GLYCOL-3350 AND ELECTROLYTES WITH FLAVOR PACK 240; 5.84; 2.98; 6.72; 22.72 G/278.26G; G/278.26G; G/278.26G; G/278.26G; G/278.26G
4000 POWDER, FOR SOLUTION ORAL ONCE
Qty: 4000 ML | Refills: 0 | Status: SHIPPED | OUTPATIENT
Start: 2025-06-17 | End: 2025-06-19

## 2025-06-17 RX ORDER — CELECOXIB 200 MG/1
CAPSULE ORAL
COMMUNITY

## 2025-06-17 NOTE — PROGRESS NOTES
Subjective     History of Present Illness:     70yo with carpal tunnel s/p release, DL, anxiety, lichen planus, prediabetes seen in follow up. Last seen 6 weeks ago with abdominal pain, distention, constipation x 8 mo complicated by bleeding hemorrhoids. Repots pain with defecation , sharp., blood on surface of stool and on tissue.    CT a/p showed small left sided Spigelian hernia containing a loop of unobstructed small bowel and a suspected small focus of fat necrosis, diverticulosis. She also noted to have 1cm nonobstructing left lower pole.    Pt had episode of hematuria after office visit and improvement in abdominal pain, suspected to have passed kidney stone which she has done previously.    Instructed to start miralax bid. Given hydrocortisone for hemorrhoids and nifedipine cream for suspected anal fissure.  Reports no longer abdominal pain. Has not had any rectal bleeding.  Still difficulty evacuating rectum and need to strain, feels something there.  Hydrocortisone , nifedipine improvement. Still some burning.   Has not been taking miralax daily. Does help when she takes it. Started protein shakes in Feb.     She is scheduled to see Dr. Veras in 2 days.    Colonoscopy at [facility] 3/2021- moderate difficulty due to divertica and restricted mobility- diverticulosis, narrowing associated with diverticular opening, otherwise normal    Soc hx: former smoker quit 2013, 4 etoh drinks per week, + marijuana    Fam hx:  Father lung cancer, bladder cancer, Mitral valve  Mother gastric cancer, RA, CVA  GF dementia  Grandmother, uncle colon cancer   Grandmother DM  Cousins breast cancer    Surgeries: appendectomy, hernia repair, carpal tunnel    Allergies  RX Allergies[1]    Medications  Current Outpatient Medications   Medication Instructions    acetaminophen (TYLENOL EXTRA STRENGTH) 1,000 mg, oral, Every 8 hours PRN    celecoxib (CeleBREX) 200 mg capsule take 1 capsule by mouth twice a day as needed for pain     clonazePAM (KlonoPIN) 0.5 mg disintegrating tablet TAKE 1 TABLET BY MOUTH ONCE DAILY AS NEEDED FOR UP TO 30 DAYS.    hydrocortisone (Anusol-HC) 2.5 % rectal cream rectal, 2 times daily    ibuprofen 800 mg tablet     nifedipine 0.2% ointment - Compounded - Outpatient Topical, 2 times daily, After cleaning, apply around fissures. For external use only. Keep out of reach of children.    rosuvastatin (CRESTOR) 5 mg, Daily        Objective   There were no vitals taken for this visit.   Physical Exam  Vitals reviewed.   Constitutional:       General: She is awake.   Cardiovascular:      Rate and Rhythm: Normal rate and regular rhythm.   Pulmonary:      Effort: Pulmonary effort is normal.      Breath sounds: Normal breath sounds.   Abdominal:      General: Bowel sounds are normal.      Palpations: Abdomen is soft.      Tenderness: There is no abdominal tenderness.   Neurological:      Mental Status: She is alert and oriented to person, place, and time.   Psychiatric:         Attention and Perception: Attention and perception normal.         Behavior: Behavior normal.           Assessment/Plan:    68yo with carpal tunnel s/p release, DL, anxiety, lichen planus, prediabetes seen in follow up for evaluation of abdominal discomfort, constipation, anorectal pain. Improvement of anorectal pain with hydrocortisone for hemorrhoids, also given nifedipine cream.  Abd pain improved , suspected to have passed kidney stone. Still with constipation. Recommend colonoscopy to evaluate. Strart miralax daily. Surgery consult for hernia with possible fat necrosis.    Rec  Start miralax daily  Schedule colonoscopy   Follow up with surgery as planned    Follow up in 3 mo            Denise Chavarria MD              [1]   Allergies  Allergen Reactions    Atorvastatin Unknown     muscles and joints achy    Oxycodone Itching    Sulfa (Sulfonamide Antibiotics) Rash     legs burn

## 2025-06-19 ENCOUNTER — APPOINTMENT (OUTPATIENT)
Dept: SURGERY | Facility: CLINIC | Age: 70
End: 2025-06-19
Payer: MEDICARE

## 2025-06-19 VITALS
WEIGHT: 158.5 LBS | DIASTOLIC BLOOD PRESSURE: 72 MMHG | SYSTOLIC BLOOD PRESSURE: 117 MMHG | HEART RATE: 84 BPM | TEMPERATURE: 96.6 F | BODY MASS INDEX: 29.95 KG/M2

## 2025-06-19 DIAGNOSIS — K43.9 SPIGELIAN HERNIA: Primary | ICD-10-CM

## 2025-06-19 DIAGNOSIS — K46.9 HERNIA OF SMALL INTESTINE: ICD-10-CM

## 2025-06-19 PROCEDURE — 99203 OFFICE O/P NEW LOW 30 MIN: CPT | Performed by: SURGERY

## 2025-06-19 PROCEDURE — 1160F RVW MEDS BY RX/DR IN RCRD: CPT | Performed by: SURGERY

## 2025-06-19 PROCEDURE — 1126F AMNT PAIN NOTED NONE PRSNT: CPT | Performed by: SURGERY

## 2025-06-19 PROCEDURE — 1159F MED LIST DOCD IN RCRD: CPT | Performed by: SURGERY

## 2025-06-19 PROCEDURE — 1036F TOBACCO NON-USER: CPT | Performed by: SURGERY

## 2025-06-19 RX ORDER — IBUPROFEN 200 MG
200 TABLET ORAL ONCE AS NEEDED
COMMUNITY

## 2025-06-19 ASSESSMENT — PAIN SCALES - GENERAL: PAINLEVEL_OUTOF10: 0-NO PAIN

## 2025-06-19 NOTE — PROGRESS NOTES
History Of Present Illness  Melissa Hand is a 69 y.o. female presenting with a left spigelian hernia.  She has noticed this bulge.  She has had previous appendectomy.  She had a previous incarcerated hernia in her appendectomy incision.  She subsequently had an abdominoplasty.  She still works as a radiology technician.  She had routine colonoscopies.  She has noticed this bulge at time when she has constipation and gets worse.  She has no history of coronary artery disease.        Last Recorded Vitals  Blood pressure 117/72, pulse 84, temperature 35.9 °C (96.6 °F), weight 71.9 kg (158 lb 8 oz).  Physical Examination  Awake and alert.  Normal respiration.  Abdominal exam she does have the abdominoplasty incisions.  This took where previous appendectomy incision and repair.  He can palpate a spigelian hernia on the left side.      Relevant Results  I reviewed the CT scan with her.  We could see the spigelian hernia.  And sizing is 4 cm x 3 cm    Assessment/Plan spigelian hernia.  I reviewed the hernia booklets with her.  Will plan a laparoscopic repair of the spigelian hernia at her convenience.  She agrees with this plan.    Rai Veras MD FACS  Professor of Surgery  Redd Medina Chair in Surgical [place]  [facility] [employer]  [address] [place] [address], [id number]  Phone [phone]  email: [email or web address]

## 2025-06-19 NOTE — H&P (VIEW-ONLY)
History Of Present Illness  Melissa Hand is a 69 y.o. female presenting with a left spigelian hernia.  She has noticed this bulge.  She has had previous appendectomy.  She had a previous incarcerated hernia in her appendectomy incision.  She subsequently had an abdominoplasty.  She still works as a radiology technician.  She had routine colonoscopies.  She has noticed this bulge at time when she has constipation and gets worse.  She has no history of coronary artery disease.        Last Recorded Vitals  Blood pressure 117/72, pulse 84, temperature 35.9 °C (96.6 °F), weight 71.9 kg (158 lb 8 oz).  Physical Examination  Awake and alert.  Normal respiration.  Abdominal exam she does have the abdominoplasty incisions.  This took where previous appendectomy incision and repair.  He can palpate a spigelian hernia on the left side.      Relevant Results  I reviewed the CT scan with her.  We could see the spigelian hernia.  And sizing is 4 cm x 3 cm    Assessment/Plan spigelian hernia.  I reviewed the hernia booklets with her.  Will plan a laparoscopic repair of the spigelian hernia at her convenience.  She agrees with this plan.    Rai Veras MD FACS  Professor of Surgery  Redd Medina Chair in Surgical [place]  [facility] [employer]  [address] [place] [address], [address]  Phone [phone]  email: [email or web address]

## 2025-07-10 ENCOUNTER — ANESTHESIA EVENT (OUTPATIENT)
Dept: OPERATING ROOM | Facility: HOSPITAL | Age: 70
End: 2025-07-10
Payer: MEDICARE

## 2025-07-11 ENCOUNTER — PHARMACY VISIT (OUTPATIENT)
Dept: PHARMACY | Facility: CLINIC | Age: 70
End: 2025-07-11
Payer: COMMERCIAL

## 2025-07-11 ENCOUNTER — HOSPITAL ENCOUNTER (OUTPATIENT)
Facility: HOSPITAL | Age: 70
Setting detail: OUTPATIENT SURGERY
Discharge: HOME | End: 2025-07-11
Attending: SURGERY | Admitting: SURGERY
Payer: MEDICARE

## 2025-07-11 ENCOUNTER — ANESTHESIA (OUTPATIENT)
Dept: OPERATING ROOM | Facility: HOSPITAL | Age: 70
End: 2025-07-11
Payer: MEDICARE

## 2025-07-11 VITALS
HEIGHT: 61 IN | BODY MASS INDEX: 29.18 KG/M2 | SYSTOLIC BLOOD PRESSURE: 138 MMHG | DIASTOLIC BLOOD PRESSURE: 69 MMHG | WEIGHT: 154.54 LBS | OXYGEN SATURATION: 97 % | RESPIRATION RATE: 15 BRPM | HEART RATE: 70 BPM | TEMPERATURE: 96.8 F

## 2025-07-11 DIAGNOSIS — K43.9 SPIGELIAN HERNIA: Primary | ICD-10-CM

## 2025-07-11 PROBLEM — R73.03 PRE-DIABETES: Status: ACTIVE | Noted: 2025-07-11

## 2025-07-11 PROCEDURE — 7100000002 HC RECOVERY ROOM TIME - EACH INCREMENTAL 1 MINUTE: Performed by: SURGERY

## 2025-07-11 PROCEDURE — 2500000004 HC RX 250 GENERAL PHARMACY W/ HCPCS (ALT 636 FOR OP/ED): Mod: JZ | Performed by: ANESTHESIOLOGY

## 2025-07-11 PROCEDURE — 3600000009 HC OR TIME - EACH INCREMENTAL 1 MINUTE - PROCEDURE LEVEL FOUR: Performed by: SURGERY

## 2025-07-11 PROCEDURE — 3700000002 HC GENERAL ANESTHESIA TIME - EACH INCREMENTAL 1 MINUTE: Performed by: SURGERY

## 2025-07-11 PROCEDURE — 7100000010 HC PHASE TWO TIME - EACH INCREMENTAL 1 MINUTE: Performed by: SURGERY

## 2025-07-11 PROCEDURE — 2500000001 HC RX 250 WO HCPCS SELF ADMINISTERED DRUGS (ALT 637 FOR MEDICARE OP): Performed by: ANESTHESIOLOGY

## 2025-07-11 PROCEDURE — RXMED WILLOW AMBULATORY MEDICATION CHARGE

## 2025-07-11 PROCEDURE — 49593 RPR AA HRN 1ST 3-10 RDC: CPT | Performed by: SURGERY

## 2025-07-11 PROCEDURE — 2500000005 HC RX 250 GENERAL PHARMACY W/O HCPCS: Performed by: SURGERY

## 2025-07-11 PROCEDURE — 2780000003 HC OR 278 NO HCPCS: Performed by: SURGERY

## 2025-07-11 PROCEDURE — 3600000004 HC OR TIME - INITIAL BASE CHARGE - PROCEDURE LEVEL FOUR: Performed by: SURGERY

## 2025-07-11 PROCEDURE — C1781 MESH (IMPLANTABLE): HCPCS | Performed by: SURGERY

## 2025-07-11 PROCEDURE — 2500000004 HC RX 250 GENERAL PHARMACY W/ HCPCS (ALT 636 FOR OP/ED)

## 2025-07-11 PROCEDURE — 3700000001 HC GENERAL ANESTHESIA TIME - INITIAL BASE CHARGE: Performed by: SURGERY

## 2025-07-11 PROCEDURE — 7100000001 HC RECOVERY ROOM TIME - INITIAL BASE CHARGE: Performed by: SURGERY

## 2025-07-11 PROCEDURE — 7100000009 HC PHASE TWO TIME - INITIAL BASE CHARGE: Performed by: SURGERY

## 2025-07-11 PROCEDURE — 2720000007 HC OR 272 NO HCPCS: Performed by: SURGERY

## 2025-07-11 DEVICE — BARD MESH, 6" X 6" (15 CM X 15 CM)
Type: IMPLANTABLE DEVICE | Site: ABDOMEN | Status: FUNCTIONAL
Brand: BARD

## 2025-07-11 RX ORDER — ROCURONIUM BROMIDE 10 MG/ML
INJECTION, SOLUTION INTRAVENOUS AS NEEDED
Status: DISCONTINUED | OUTPATIENT
Start: 2025-07-11 | End: 2025-07-11

## 2025-07-11 RX ORDER — CEFAZOLIN 1 G/1
INJECTION, POWDER, FOR SOLUTION INTRAVENOUS AS NEEDED
Status: DISCONTINUED | OUTPATIENT
Start: 2025-07-11 | End: 2025-07-11

## 2025-07-11 RX ORDER — LIDOCAINE HYDROCHLORIDE 10 MG/ML
0.1 INJECTION, SOLUTION EPIDURAL; INFILTRATION; INTRACAUDAL; PERINEURAL ONCE
Status: DISCONTINUED | OUTPATIENT
Start: 2025-07-11 | End: 2025-07-11 | Stop reason: HOSPADM

## 2025-07-11 RX ORDER — ACETAMINOPHEN 325 MG/1
650 TABLET ORAL EVERY 4 HOURS PRN
Status: DISCONTINUED | OUTPATIENT
Start: 2025-07-11 | End: 2025-07-11 | Stop reason: HOSPADM

## 2025-07-11 RX ORDER — FENTANYL CITRATE 50 UG/ML
INJECTION, SOLUTION INTRAMUSCULAR; INTRAVENOUS AS NEEDED
Status: DISCONTINUED | OUTPATIENT
Start: 2025-07-11 | End: 2025-07-11

## 2025-07-11 RX ORDER — ALBUTEROL SULFATE 0.83 MG/ML
2.5 SOLUTION RESPIRATORY (INHALATION) ONCE AS NEEDED
Status: DISCONTINUED | OUTPATIENT
Start: 2025-07-11 | End: 2025-07-11 | Stop reason: HOSPADM

## 2025-07-11 RX ORDER — IBUPROFEN 800 MG/1
800 TABLET, FILM COATED ORAL 4 TIMES DAILY
Qty: 90 TABLET | Refills: 0 | Status: SHIPPED | OUTPATIENT
Start: 2025-07-11

## 2025-07-11 RX ORDER — MIDAZOLAM HYDROCHLORIDE 1 MG/ML
INJECTION INTRAMUSCULAR; INTRAVENOUS AS NEEDED
Status: DISCONTINUED | OUTPATIENT
Start: 2025-07-11 | End: 2025-07-11

## 2025-07-11 RX ORDER — METHOCARBAMOL 500 MG/1
500 TABLET, FILM COATED ORAL 4 TIMES DAILY PRN
Qty: 28 TABLET | Refills: 0 | Status: SHIPPED | OUTPATIENT
Start: 2025-07-11 | End: 2025-07-11 | Stop reason: HOSPADM

## 2025-07-11 RX ORDER — SODIUM CHLORIDE 0.9 G/100ML
INJECTION, SOLUTION IRRIGATION AS NEEDED
Status: DISCONTINUED | OUTPATIENT
Start: 2025-07-11 | End: 2025-07-11 | Stop reason: HOSPADM

## 2025-07-11 RX ORDER — ONDANSETRON HYDROCHLORIDE 2 MG/ML
4 INJECTION, SOLUTION INTRAVENOUS ONCE AS NEEDED
Status: DISCONTINUED | OUTPATIENT
Start: 2025-07-11 | End: 2025-07-11 | Stop reason: HOSPADM

## 2025-07-11 RX ORDER — PROPOFOL 10 MG/ML
INJECTION, EMULSION INTRAVENOUS AS NEEDED
Status: DISCONTINUED | OUTPATIENT
Start: 2025-07-11 | End: 2025-07-11

## 2025-07-11 RX ORDER — BUPIVACAINE HYDROCHLORIDE AND EPINEPHRINE 5; 5 MG/ML; UG/ML
INJECTION, SOLUTION EPIDURAL; INTRACAUDAL; PERINEURAL AS NEEDED
Status: DISCONTINUED | OUTPATIENT
Start: 2025-07-11 | End: 2025-07-11 | Stop reason: HOSPADM

## 2025-07-11 RX ORDER — OXYCODONE AND ACETAMINOPHEN 5; 325 MG/1; MG/1
1 TABLET ORAL EVERY 6 HOURS PRN
Qty: 6 TABLET | Refills: 0 | Status: SHIPPED | OUTPATIENT
Start: 2025-07-11

## 2025-07-11 RX ORDER — SODIUM CHLORIDE, SODIUM LACTATE, POTASSIUM CHLORIDE, CALCIUM CHLORIDE 600; 310; 30; 20 MG/100ML; MG/100ML; MG/100ML; MG/100ML
100 INJECTION, SOLUTION INTRAVENOUS CONTINUOUS
Status: DISCONTINUED | OUTPATIENT
Start: 2025-07-11 | End: 2025-07-11 | Stop reason: HOSPADM

## 2025-07-11 RX ORDER — ACETAMINOPHEN 325 MG/1
650 TABLET ORAL 4 TIMES DAILY
Qty: 30 TABLET | Refills: 0 | Status: SHIPPED | OUTPATIENT
Start: 2025-07-11

## 2025-07-11 RX ORDER — IPRATROPIUM BROMIDE 0.5 MG/2.5ML
500 SOLUTION RESPIRATORY (INHALATION) ONCE
Status: DISCONTINUED | OUTPATIENT
Start: 2025-07-11 | End: 2025-07-11 | Stop reason: HOSPADM

## 2025-07-11 RX ORDER — LIDOCAINE HYDROCHLORIDE 20 MG/ML
INJECTION, SOLUTION EPIDURAL; INFILTRATION; INTRACAUDAL; PERINEURAL AS NEEDED
Status: DISCONTINUED | OUTPATIENT
Start: 2025-07-11 | End: 2025-07-11

## 2025-07-11 RX ORDER — ONDANSETRON HYDROCHLORIDE 2 MG/ML
INJECTION, SOLUTION INTRAVENOUS AS NEEDED
Status: DISCONTINUED | OUTPATIENT
Start: 2025-07-11 | End: 2025-07-11

## 2025-07-11 RX ORDER — ACETAMINOPHEN 325 MG/1
650 TABLET ORAL EVERY 6 HOURS SCHEDULED
Qty: 56 TABLET | Refills: 0 | Status: CANCELLED | OUTPATIENT
Start: 2025-07-11 | End: 2025-07-18

## 2025-07-11 RX ORDER — DROPERIDOL 2.5 MG/ML
0.62 INJECTION, SOLUTION INTRAMUSCULAR; INTRAVENOUS ONCE AS NEEDED
Status: DISCONTINUED | OUTPATIENT
Start: 2025-07-11 | End: 2025-07-11 | Stop reason: HOSPADM

## 2025-07-11 RX ORDER — HYDROCODONE BITARTRATE AND ACETAMINOPHEN 5; 325 MG/1; MG/1
1 TABLET ORAL EVERY 4 HOURS PRN
Status: DISCONTINUED | OUTPATIENT
Start: 2025-07-11 | End: 2025-07-11 | Stop reason: HOSPADM

## 2025-07-11 RX ADMIN — PROPOFOL 20 MG: 10 INJECTION, EMULSION INTRAVENOUS at 11:30

## 2025-07-11 RX ADMIN — PROPOFOL 150 MG: 10 INJECTION, EMULSION INTRAVENOUS at 10:48

## 2025-07-11 RX ADMIN — HYDROMORPHONE HYDROCHLORIDE 0.5 MG: 1 INJECTION, SOLUTION INTRAMUSCULAR; INTRAVENOUS; SUBCUTANEOUS at 11:56

## 2025-07-11 RX ADMIN — SUGAMMADEX 200 MG: 100 INJECTION, SOLUTION INTRAVENOUS at 11:29

## 2025-07-11 RX ADMIN — ROCURONIUM BROMIDE 70 MG: 10 INJECTION INTRAVENOUS at 10:49

## 2025-07-11 RX ADMIN — CEFAZOLIN 2 G: 1 INJECTION, POWDER, FOR SOLUTION INTRAMUSCULAR; INTRAVENOUS at 10:53

## 2025-07-11 RX ADMIN — LIDOCAINE HYDROCHLORIDE 100 MG: 20 INJECTION, SOLUTION EPIDURAL; INFILTRATION; INTRACAUDAL; PERINEURAL at 10:48

## 2025-07-11 RX ADMIN — FENTANYL CITRATE 50 MCG: 50 INJECTION, SOLUTION INTRAMUSCULAR; INTRAVENOUS at 10:48

## 2025-07-11 RX ADMIN — HYDROCODONE BITARTRATE AND ACETAMINOPHEN 1 TABLET: 5; 325 TABLET ORAL at 12:11

## 2025-07-11 RX ADMIN — MIDAZOLAM HYDROCHLORIDE 2 MG: 1 INJECTION, SOLUTION INTRAMUSCULAR; INTRAVENOUS at 10:40

## 2025-07-11 RX ADMIN — DEXAMETHASONE SODIUM PHOSPHATE 8 MG: 4 INJECTION, SOLUTION INTRAMUSCULAR; INTRAVENOUS at 10:53

## 2025-07-11 RX ADMIN — SODIUM CHLORIDE, SODIUM LACTATE, POTASSIUM CHLORIDE, AND CALCIUM CHLORIDE: .6; .31; .03; .02 INJECTION, SOLUTION INTRAVENOUS at 10:45

## 2025-07-11 RX ADMIN — ONDANSETRON 4 MG: 2 INJECTION, SOLUTION INTRAMUSCULAR; INTRAVENOUS at 11:10

## 2025-07-11 ASSESSMENT — PAIN SCALES - GENERAL
PAINLEVEL_OUTOF10: 4
PAINLEVEL_OUTOF10: 0 - NO PAIN
PAINLEVEL_OUTOF10: 7
PAINLEVEL_OUTOF10: 7
PAINLEVEL_OUTOF10: 3
PAINLEVEL_OUTOF10: 4

## 2025-07-11 ASSESSMENT — PAIN - FUNCTIONAL ASSESSMENT
PAIN_FUNCTIONAL_ASSESSMENT: 0-10

## 2025-07-11 ASSESSMENT — PAIN DESCRIPTION - DESCRIPTORS
DESCRIPTORS: SORE
DESCRIPTORS: SHARP
DESCRIPTORS: SORE
DESCRIPTORS: SORE

## 2025-07-11 ASSESSMENT — COLUMBIA-SUICIDE SEVERITY RATING SCALE - C-SSRS
2. HAVE YOU ACTUALLY HAD ANY THOUGHTS OF KILLING YOURSELF?: NO
1. IN THE PAST MONTH, HAVE YOU WISHED YOU WERE DEAD OR WISHED YOU COULD GO TO SLEEP AND NOT WAKE UP?: NO
6. HAVE YOU EVER DONE ANYTHING, STARTED TO DO ANYTHING, OR PREPARED TO DO ANYTHING TO END YOUR LIFE?: NO

## 2025-07-11 NOTE — PERIOPERATIVE NURSING NOTE
1230- PHASE II     1245- Home going instructions and Rx went over with patient and patient's family member. Educated on when to follow up with provider. All questions answered and patient and patient's family member verified understanding verbally.    1252- IV removed at this time with IV catheter tip intact upon removal    1256- Meds-to-Beds at bedside delivering patient's home-going medication    1300- Patient transported to [facility] at this time in stable condition and with all belongings via wheelchair.

## 2025-07-11 NOTE — OP NOTE
Laparoscopic Ventral Hernia Repair (L) Operative Note     Date: 2025  OR Location: [facility] A OR    Name: Melissa Hand, : 1955, Age: 69 y.o., MRN: [id number], Sex: female    Diagnosis  Pre-op Diagnosis      * Spigelian hernia [K43.9] Post-op Diagnosis     * Spigelian hernia [K43.9]     Procedures  Laparoscopic Ventral Hernia Repair  76716 - TX RPR AA HERNIA 1ST 3-10 CM REDUCIBLE      Surgeons      * Rai Veras - Primary    Resident/Fellow/Other Assistant:  Surgeons and Role:  * No surgeons found with a matching role *    Staff:   Circulator: Melia De La Cruz Person: Jil  Circulator: Hui    Anesthesia Staff: Anesthesiologist: Sumanth Carpio MD  CRNA: FEDERICA Worrell-ROMEL  SRNA: Boni Chan    Procedure Summary  Anesthesia: General  ASA: III  Estimated Blood Loss: 5mL  Intra-op Medications:   Administrations occurring from 1005 to 1155 on 25:   Medication Name Total Dose   sodium chloride 0.9 % irrigation solution 1,000 mL   BUPivacaine-EPINEPHrine (PF) (Marcaine w/EPI) 0.5 %-1:200,000 injection 22 mL   ceFAZolin (Ancef) vial 1 g 2 g   dexAMETHasone (Decadron) 4 mg/mL IV Syringe 2 mL 8 mg   fentaNYL (Sublimaze) injection 50 mcg/mL 50 mcg   LR bolus Cannot be calculated   lidocaine PF (Xylocaine-MPF) local injection 2 % 100 mg   midazolam PF (Versed) injection 1 mg/mL 2 mg   ondansetron (Zofran) 2 mg/mL injection 4 mg   propofol (Diprivan) injection 10 mg/mL 170 mg   rocuronium (ZeMuron) 50 mg/5 mL injection 70 mg   sugammadex (Bridion) 200 mg/2 mL injection 200 mg              Anesthesia Record               Intraprocedure I/O Totals       None           Specimen: No specimens collected              Drains and/or Catheters: * None in log *    Tourniquet Times:         Implants:  Implants       Type Name Action Serial No.      Surgical Mesh Sling Implant PATCH, MESH, MARLEX, 6 X 6 IN, POLYPROPYLENE - SN/A - [id number] Implanted N/A              Findings: 4 cm facial defect of gagegellian  hernia    Indications: Melissa Hand is an 69 y.o. female who is having surgery for Spigelian hernia [K43.9].     The patient was seen in the preoperative area. The risks, benefits, complications, treatment options, non-operative alternatives, expected recovery and outcomes were discussed with the patient. The possibilities of reaction to medication, pulmonary aspiration, injury to surrounding structures, bleeding, recurrent infection, the need for additional procedures, failure to diagnose a condition, and creating a complication requiring transfusion or operation were discussed with the patient. The patient concurred with the proposed plan, giving informed consent.  The site of surgery was properly noted/marked if necessary per policy. The patient has been actively warmed in preoperative area. Preoperative antibiotics have been ordered and given within 1 hours of incision. Venous thrombosis prophylaxis have been ordered including bilateral sequential compression devices    Procedure Details: Patient brought to the op room general endotracheal anesthesia was given.  Her abdomen is prepped and draped.  Using a Garcia technique in the right side of her abdomen entered and insufflated her abdomen.  We can very easily see her spigelian hernia on the left.  I could see her previous appendiceal scar incisional hernia that was repaired with mesh.  There was adhesions of bowel to that.  We did not do anything with that.  I placed 2 additional trocars in her abdomen.  I then opened up the preperitoneal space medial to the spigelian hernia.  I was able to reduce the entire hernia sac.  I opened up medially laterally inferiorly and cephalad.  That we had to have a nice area for the mesh.  I used the Endo Close and interrupted Prolene sutures to close the fascial defect.  I placed a 4 inch x 4 inch piece of mesh to try to have nice overlap.  I fixated the mesh with a transfascial suture in the middle.  I then used absorber  tack to fixate the mesh circumferentially.  I then lifted up the peritoneal so this was complete preperitoneal hernia repair and I closed the peritoneum completely over the mesh in this preperitoneal space.  I then slowly desufflated the abdomen remove my trocars.  Closed the fascial defect from my Garcia with 0 Vicryl and skin incisions with 4-0 Vicryl  Evidence of Infection: No   Complications:  None; patient tolerated the procedure well.    Disposition: PACU - hemodynamically stable.  Condition: stable           Attending Attestation: I was present and scrubbed for the entire procedure.    Rai Veras  Phone Number: [phone]

## 2025-07-11 NOTE — ANESTHESIA POSTPROCEDURE EVALUATION
Patient: Melissa Hand    Procedure Summary       Date: 07/11/25 Room / Location: [facility] OR 09 / Virtual [facility] OR    Anesthesia Start: 1045 Anesthesia Stop: 1142    Procedure: Laparoscopic Ventral Hernia Repair (Left) Diagnosis:       Spigelian hernia      (Spigelian hernia [K43.9])    Surgeons: Rai Veras MD Responsible Provider: Sumanth Carpio MD    Anesthesia Type: general ASA Status: 3            Anesthesia Type: general    Vitals Value Taken Time   BP  07/11/25 12:29   Temp  07/11/25 12:29   Pulse  07/11/25 12:29   Resp  07/11/25 12:29   SpO2  07/11/25 12:29       Anesthesia Post Evaluation    Patient location during evaluation: bedside  Patient participation: complete - patient participated  Level of consciousness: awake  Pain management: adequate  Airway patency: patent  Cardiovascular status: acceptable  Respiratory status: acceptable  Hydration status: acceptable  Postoperative Nausea and Vomiting: none        There were no known notable events for this encounter.

## 2025-07-11 NOTE — ANESTHESIA PROCEDURE NOTES
Airway  Date/Time: 7/11/2025 10:51 AM  Reason: elective    Airway not difficult    Staffing  Performed: DINESH   Authorized by: Sumanth Carpio MD    Performed by: Boni Chan  Patient location during procedure: OR    Patient Condition  Indications for airway management: anesthesia and airway protection  Patient position: sniffing  MILS maintained throughout  Planned trial extubation  Sedation level: deep     Final Airway Details   Preoxygenated: yes  Final airway type: endotracheal airway  Successful airway: ETT  Cuffed: yes   Successful intubation technique: direct laryngoscopy  Adjuncts used in placement: intubating stylet  Endotracheal tube insertion site: oral  Blade: Santy  Blade size: #3  ETT size (mm): 7.0  Cormack-Lehane Classification: grade IIa - partial view of glottis  Placement verified by: chest auscultation and capnometry   Cuff volume (mL): 9  Measured from: lips  ETT to lips (cm): 22  Number of attempts at approach: 1    Additional Comments  Atraumatic intubation to lips and teeth

## 2025-07-11 NOTE — ANESTHESIA PREPROCEDURE EVALUATION
Pre-Anesthesia Evaluation                                         Melissa Hand is a 69 y.o. female who presents for the above mentioned procedure due to Spigelian hernia [K43.9]    Medical History[1]  Surgical History[2]  Social History[3]  RX Allergies[4]  Current Medications[5]  Prior to Admission medications    Medication Sig Start Date End Date Taking? Authorizing Provider   acetaminophen (Tylenol Extra Strength) 500 mg tablet Take 2 tablets (1,000 mg) by mouth every 8 hours if needed for mild pain (1 - 3). 2/7/25   Arpit Brewster MD   celecoxib (CeleBREX) 200 mg capsule take 1 capsule by mouth twice a day as needed for pain    Historical Provider, MD   cholecalciferol (Vitamin D-3) 125 mcg (5,000 units) tablet Take by mouth once daily.    Historical Provider, MD   clonazePAM (KlonoPIN) 0.5 mg disintegrating tablet TAKE 1 TABLET BY MOUTH ONCE DAILY AS NEEDED FOR UP TO 30 DAYS.    Historical Provider, MD   hydrocortisone (Anusol-HC) 2.5 % rectal cream Insert into the rectum 2 times a day. 5/14/25 6/19/25  Denise Chavarria MD   ibuprofen 200 mg tablet Take 1 tablet (200 mg) by mouth 1 time if needed for mild pain (1 - 3).    Historical Provider, MD   ibuprofen 800 mg tablet     Historical Provider, MD   nifedipine 0.2% ointment - Compounded - Outpatient Apply topically 2 times a day. After cleaning, apply around fissures. For external use only. Keep out of reach of children. 5/14/25 8/12/25  Denise Chavarria MD   rosuvastatin (Crestor) 5 mg tablet Take 1 tablet (5 mg) by mouth once daily.    Historical Provider, MD     No medication comments found.  Visit Vitals  OB Status Postmenopausal   Smoking Status Never                             6/19/2025     2:41 PM 2/7/2025     9:04 AM 2/7/2025     8:57 AM 2/7/2025     8:52 AM 2/7/2025     8:47 AM 2/7/2025     8:42 AM 2/7/2025     8:06 AM   BP REVIEW   BP (ultimate) 117/72 152/89 155/85 161/79 158/80 170/77  "133/66     Recent Labs     05/14/25  1142   WBC 9.0   HGB 14.0   HCT 43.0      MCV 87     Recent Labs     05/14/25  1142   EGFR 85   ANIONGAP 13   BUN 20   CREATININE 0.76      K 4.5      CO2 26   GLUCOSE 140*   CALCIUM 9.5     Recent Labs     02/06/25  0929   HGBA1C 5.9*     Recent Labs     05/14/25  1142   BILITOT 0.4   PROT 7.1   ALBUMIN 4.6   ALKPHOS 60   ALT 25   AST 24     No results for input(s): \"PROTIME\", \"INR\", \"LABRH\", \"ABSCRN\" in the last 01229 hours.  No results found for: \"FERRITIN\", \"TIBC\", \"IRONSAT\"  No results found for: \"STAPHMRSASCR\"  No results for input(s): \"AMPHETAMINE\", \"MAMPHBLDS\", \"BARBITURATE\", \"BENZODIAZ\", \"BUPRENBLDS\", \"CANNABBLDS\", \"COCBLDS\", \"METHABLDS\", \"OXYBLDS\", \"PCPBLDS\", \"OPIATBLDS\", \"DRBLDCOMM\" in the last 92312 hours.  No results for input(s): \"PHART\", \"RII0MOF\", \"PO2ART\", \"DCG0HUN\", \"I4GAJWNA\", \"BEART\", \"D3DXEUKT\" in the last 77009 hours.      No results found for: \"ABO\"  EKG No results found for this or any previous visit (from the past 4464 hours).  Ejection Fractions:No results found for: \"EF\"  Echocardiogram   No results found for this or any previous visit from the past 01963 days.     Stress TestingNo results found for this or any previous visit from the past 21366 days.    Cardiac CatheterizationNo results found for this or any previous visit from the past 90804 days.    Cardiac Scoring No results found for this or any previous visit from the past 99395 days.    AAA screenNo results found for this or any previous visit from the past 44815 days.    Carotid DopplerNo results found for this or any previous visit from the past 21857 days.    X Ray   Pulmonary Function Tests  No results found for: \"FEV1\", \"FVC\", \"ZTC4JEN\", \"TLC\", \"DLCO\"   OTHER: No results found for this or any previous visit from the past 1825 days.        No results found for: \"PREGTESTUR\", \"PREGSERUM\", \"HCG\", \"HCGQUANT\"  The ASCVD Risk score (Agnieszka MEJIA, et al., 2019) failed to calculate " for the following reasons:    Cannot find a previous HDL lab    Cannot find a previous total cholesterol lab    Code Status: Assume Full                Patient: Melissa Hand    Procedure Information       Date/Time: 25 1005    Procedure: Laparoscopic Ventral Hernia Repair (Left)    Location: [facility] OR  [facility] A OR    Surgeons: Rai Veras MD            Relevant Problems   Neuro   (+) Bilateral carpal tunnel syndrome   (+) Carpal tunnel syndrome, bilateral      Endocrine   (+) Pre-diabetes      Musculoskeletal   (+) Bilateral carpal tunnel syndrome   (+) Carpal tunnel syndrome, bilateral       Clinical information reviewed:                   NPO Detail:  No data recorded     Physical Exam    Airway  Mallampati: II     Cardiovascular   Rhythm: regular  Rate: normal     Dental    Pulmonary    Abdominal            Anesthesia Plan    History of general anesthesia?: yes  History of complications of general anesthesia?: no    ASA 3     general     intravenous induction   Anesthetic plan and risks discussed with patient.    Plan discussed with CRNA and CAA.           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     Hyperlipidemia    [2]   Past Surgical History:  Procedure Laterality Date    APPENDECTOMY       SECTION, LOW TRANSVERSE      COSMETIC SURGERY      HERNIA REPAIR      JOINT REPLACEMENT      SINUS SURGERY     [3]   Social History  Tobacco Use    Smoking status: Never    Smokeless tobacco: Never   Vaping Use    Vaping status: Never Used   Substance Use Topics    Alcohol use: Yes     Comment: social use    Drug use: Never   [4]   Allergies  Allergen Reactions    Atorvastatin Unknown     muscles and joints achy    Codeine Nausea/vomiting    Oxycodone Itching    Sulfa (Sulfonamide Antibiotics) Rash     legs burn   [5] No current facility-administered medications for this encounter.    Current Outpatient Medications:      acetaminophen (Tylenol Extra Strength) 500 mg tablet, Take 2 tablets (1,000 mg) by mouth every 8 hours if needed for mild pain (1 - 3)., Disp: 30 tablet, Rfl: 0    celecoxib (CeleBREX) 200 mg capsule, take 1 capsule by mouth twice a day as needed for pain, Disp: , Rfl:     cholecalciferol (Vitamin D-3) 125 mcg (5,000 units) tablet, Take by mouth once daily., Disp: , Rfl:     clonazePAM (KlonoPIN) 0.5 mg disintegrating tablet, TAKE 1 TABLET BY MOUTH ONCE DAILY AS NEEDED FOR UP TO 30 DAYS., Disp: , Rfl:     hydrocortisone (Anusol-HC) 2.5 % rectal cream, Insert into the rectum 2 times a day., Disp: 60 g, Rfl: 1    ibuprofen 200 mg tablet, Take 1 tablet (200 mg) by mouth 1 time if needed for mild pain (1 - 3)., Disp: , Rfl:     ibuprofen 800 mg tablet, , Disp: , Rfl:     nifedipine 0.2% ointment - Compounded - Outpatient, Apply topically 2 times a day. After cleaning, apply around fissures. For external use only. Keep out of reach of children., Disp: 100 g, Rfl: 1    rosuvastatin (Crestor) 5 mg tablet, Take 1 tablet (5 mg) by mouth once daily., Disp: , Rfl:

## 2025-07-15 ENCOUNTER — APPOINTMENT (OUTPATIENT)
Dept: GASTROENTEROLOGY | Facility: EXTERNAL LOCATION | Age: 70
End: 2025-07-15
Payer: MEDICARE

## 2025-07-31 ENCOUNTER — APPOINTMENT (OUTPATIENT)
Dept: SURGERY | Facility: CLINIC | Age: 70
End: 2025-07-31
Payer: MEDICARE

## 2025-07-31 VITALS
BODY MASS INDEX: 29.93 KG/M2 | TEMPERATURE: 98.1 F | DIASTOLIC BLOOD PRESSURE: 80 MMHG | HEART RATE: 85 BPM | SYSTOLIC BLOOD PRESSURE: 117 MMHG | WEIGHT: 158.4 LBS

## 2025-07-31 DIAGNOSIS — K43.9 SPIGELIAN HERNIA: Primary | ICD-10-CM

## 2025-07-31 PROCEDURE — 1036F TOBACCO NON-USER: CPT | Performed by: SURGERY

## 2025-07-31 PROCEDURE — 1125F AMNT PAIN NOTED PAIN PRSNT: CPT | Performed by: SURGERY

## 2025-07-31 PROCEDURE — 1159F MED LIST DOCD IN RCRD: CPT | Performed by: SURGERY

## 2025-07-31 PROCEDURE — 99212 OFFICE O/P EST SF 10 MIN: CPT | Performed by: SURGERY

## 2025-07-31 PROCEDURE — 1160F RVW MEDS BY RX/DR IN RCRD: CPT | Performed by: SURGERY

## 2025-07-31 ASSESSMENT — PAIN SCALES - GENERAL: PAINLEVEL_OUTOF10: 2

## 2025-07-31 NOTE — PROGRESS NOTES
History Of Present Illness  Melissa Hand is a 69 y.o. female presenting status post laparoscopic spigelian hernia repair.  She is doing well.  She is having some slight discomfort with movements but is improving.  No problems with constipation or diarrhea.      Last Recorded Vitals  Blood pressure 117/80, pulse 85, temperature 36.7 °C (98.1 °F), weight 71.8 kg (158 lb 6.4 oz).  Physical Exam incisions are well-healed.  No evidence of any hernia recurrence.      Assessment/Plan   She status post spigelian hernia laparoscopically with closure of the fascial defect and underlay mesh.  She can start resuming all of her activities.  Should be cleared to go back to work on August 15.  She will follow-up me as needed    Rai Veras MD FACS  Professor of Surgery  Redd Medina Chair in Surgical [place]  [facility] [employer]  [id number] [place] [address], [address]  Phone [phone]  email: [email or web address]

## 2025-08-25 DIAGNOSIS — Z12.11 COLON CANCER SCREENING: ICD-10-CM

## 2025-08-25 RX ORDER — POLYETHYLENE GLYCOL 3350, SODIUM SULFATE ANHYDROUS, SODIUM BICARBONATE, SODIUM CHLORIDE, POTASSIUM CHLORIDE 236; 22.74; 6.74; 5.86; 2.97 G/4L; G/4L; G/4L; G/4L; G/4L
POWDER, FOR SOLUTION ORAL
Qty: 4000 ML | Refills: 0 | Status: SHIPPED | OUTPATIENT
Start: 2025-08-25

## 2025-10-17 ENCOUNTER — APPOINTMENT (OUTPATIENT)
Dept: GASTROENTEROLOGY | Facility: EXTERNAL LOCATION | Age: 70
End: 2025-10-17
Payer: MEDICARE

## (undated) DEVICE — SYRINGE, 20 CC, LUER LOCK

## (undated) DEVICE — BLADE, ARTHRO-LOK, MINI-MENISCUS, FLAT, 4 MM

## (undated) DEVICE — TROCAR SYSTEM, BALLOON, KII GELPORT, 12 X 100MM

## (undated) DEVICE — ENDO, PORT VERSASTEP 5MM

## (undated) DEVICE — PAD, GROUNDING, ELECTROSURGICAL, W/9 FT CABLE, POLYHESIVE II, ADULT, LF

## (undated) DEVICE — PUMP, STRYKERFLOW 2 & HANDPIECE W/10FT. IRRIGATION TUBING

## (undated) DEVICE — PADDING, WEBRIL, UNDERCAST, STERILE, 3 IN

## (undated) DEVICE — SUTURE, VICRYL, 0, 27 IN, UR-6, VIOLET

## (undated) DEVICE — GLOVE, SURGICAL, PROTEXIS PI BLUE W/NEUTHERA, 8.0, PF, LF

## (undated) DEVICE — SUTURE, MONOCRYLIC, 4-0, P3, MONO 18

## (undated) DEVICE — SCOPE WARMER, LAPAROSCOPE, BAG ONLY, LF

## (undated) DEVICE — TISSUE ADHESIVE, EXOFIN, 1ML

## (undated) DEVICE — DRESSING, GAUZE, SPONGE, 12 PLY, 4 X 4 IN, PLASTIC POUCH, STRL 10PK

## (undated) DEVICE — CUFF, TOURNIQUET, 18 X 3, DUAL PORT/SNGL BLADDER, DISP, LF

## (undated) DEVICE — DRESSING, ABDOMINAL, WET PRUF, TENDERSORB, 5 X 9 IN, STERILE

## (undated) DEVICE — APPLICATOR, CHLORAPREP, W/ORANGE TINT, 26ML

## (undated) DEVICE — SHEAR, W/UNIPOLAR CAUTERY, ENDOSHEAR, 5 MM

## (undated) DEVICE — STRIP, SKIN CLOSURE, STERI STRIP, REINFORCED, 0.5 X 4 IN

## (undated) DEVICE — GLOVE, SURGICAL, PROTEXIS PI , 7.5, PF, LF

## (undated) DEVICE — BANDAGE, ELASTIC, COMPRESSION, W/REMOVABLE CLIP, 3 IN X 4.5 YD

## (undated) DEVICE — NEEDLE, INSUFFLATION, 14 G, 100 MM

## (undated) DEVICE — CORD, MONOPOLAR, HIGH FREQUENCY, W/8MM PLUG F/VALLEYLAB, 8FT/244CM, STRL

## (undated) DEVICE — DEVICE, SUTURE, ENDOCLOSE, TROCAR

## (undated) DEVICE — Device

## (undated) DEVICE — ABSORBATACK, 5MM SHORT, SINGLE USE/W 30 ABSORBABLE TACKS

## (undated) DEVICE — SUTURE, PROLENE, 2-0, 30 IN, SH, BLUE

## (undated) DEVICE — TUBE SET, PNEUMOCLEAR, SMOKE EVACU, HIGH-FLOW

## (undated) DEVICE — SUTURE, VICRYL PLUS, 4-0, 27 IN, PS-2